# Patient Record
Sex: FEMALE | Race: WHITE | NOT HISPANIC OR LATINO | Employment: FULL TIME | URBAN - METROPOLITAN AREA
[De-identification: names, ages, dates, MRNs, and addresses within clinical notes are randomized per-mention and may not be internally consistent; named-entity substitution may affect disease eponyms.]

---

## 2023-09-11 ENCOUNTER — HOSPITAL ENCOUNTER (OUTPATIENT)
Dept: DATA CONVERSION | Facility: HOSPITAL | Age: 63
End: 2023-09-11
Attending: INTERNAL MEDICINE | Admitting: INTERNAL MEDICINE
Payer: COMMERCIAL

## 2023-09-11 DIAGNOSIS — K63.3 ULCER OF INTESTINE: ICD-10-CM

## 2023-09-11 DIAGNOSIS — M15.9 POLYOSTEOARTHRITIS, UNSPECIFIED: ICD-10-CM

## 2023-09-11 DIAGNOSIS — K63.5 POLYP OF COLON: ICD-10-CM

## 2023-09-11 DIAGNOSIS — C18.9 MALIGNANT NEOPLASM OF COLON, UNSPECIFIED (MULTI): ICD-10-CM

## 2023-09-11 DIAGNOSIS — R93.3 ABNORMAL FINDINGS ON DIAGNOSTIC IMAGING OF OTHER PARTS OF DIGESTIVE TRACT: ICD-10-CM

## 2023-09-11 DIAGNOSIS — E78.5 HYPERLIPIDEMIA, UNSPECIFIED: ICD-10-CM

## 2023-09-11 DIAGNOSIS — Z86.16 PERSONAL HISTORY OF COVID-19: ICD-10-CM

## 2023-09-11 DIAGNOSIS — C18.0 MALIGNANT NEOPLASM OF CECUM (MULTI): ICD-10-CM

## 2023-09-11 DIAGNOSIS — I10 ESSENTIAL (PRIMARY) HYPERTENSION: ICD-10-CM

## 2023-09-11 DIAGNOSIS — K64.4 RESIDUAL HEMORRHOIDAL SKIN TAGS: ICD-10-CM

## 2023-09-15 LAB
COMPLETE PATHOLOGY REPORT: NORMAL
CONVERTED ADDENDUM COMMENT 2: NORMAL
CONVERTED FINAL DIAGNOSIS: NORMAL
CONVERTED FINAL REPORT PDF LINK TO COPY AND PASTE: NORMAL
CONVERTED GROSS DESCRIPTION: NORMAL
CONVERTED PHYSICIAN NOTIFICATION: NORMAL

## 2023-09-27 PROBLEM — C18.9 ADENOCARCINOMA OF COLON (MULTI): Status: ACTIVE | Noted: 2023-09-27

## 2023-09-27 RX ORDER — ATORVASTATIN CALCIUM 20 MG/1
20 TABLET, FILM COATED ORAL DAILY
COMMUNITY
Start: 2023-07-31

## 2023-09-27 RX ORDER — METHYLPREDNISOLONE 4 MG/1
TABLET ORAL
COMMUNITY
Start: 2022-11-14 | End: 2023-11-16 | Stop reason: HOSPADM

## 2023-09-29 VITALS — BODY MASS INDEX: 21.8 KG/M2 | HEIGHT: 67 IN | WEIGHT: 138.89 LBS

## 2023-10-02 NOTE — PROGRESS NOTES
Subjective   Patient ID: Martine Cornejo is a 63 y.o. female who presents to clinic for imaging review and plan for next steps in treatment of new colon cancer.       HPI  Colonoscopy (to cecum) on 9/11/23 Impression:   - Hemorrhoids found on perianal exam.   - Perianal skin tags found on perianal exam.   - Likely malignant completely obstructing tumor in the region of the proximal ascending colon and cecum. Biopsied.   - One 5 mm polyp in the sigmoid colon, removed with a cold snare. Resected and retrieved.   - Non-bleeding external hemorrhoids  FINAL DIAGNOSIS A. CECUM MASS: -- INVASIVE MODERATELY DIFFERENTIATED ADENOCARCINOMA (COMMENT)   B. SIGMOID COLON, POLYP, COLD SNARE: -- HYPERPLASTIC POLYP  MMR - intact      9/27/23 - PET/ CT IMPRESSION:   1. Intensely FDG avid cecal wall thickening possibly extending to the parietal peritoneum compatible with recently diagnosed invasive cecal adenocarcinoma.   2. Additional nonspecific hypermetabolic focus along the cecal wall superolateral to the aforementioned mass. Finding may be physiologic with an additional site of neoplasm not excluded.   3. No PET-CT evidence for FDG avid locoregional or distant metastatic disease    Review of Systems   Constitutional:  Negative for activity change, appetite change, fatigue, fever and unexpected weight change.   HENT:  Negative for sore throat.    Eyes:  Negative for visual disturbance.   Respiratory:  Negative for shortness of breath.    Cardiovascular:  Negative for chest pain, palpitations and leg swelling.   Gastrointestinal:  Negative for abdominal distention, abdominal pain, anal bleeding, blood in stool, constipation, diarrhea, nausea, rectal pain and vomiting.   Endocrine: Negative for polydipsia.   Genitourinary:  Negative for difficulty urinating and dysuria.   Musculoskeletal:  Negative for arthralgias.   Skin:  Negative for wound.   Neurological:  Negative for dizziness, weakness and light-headedness.   Hematological:   Negative for adenopathy.   Psychiatric/Behavioral:  Negative for confusion.          Objective   Physical Exam  Vitals reviewed. Exam conducted with a chaperone present.   Constitutional:       Appearance: Normal appearance.   Cardiovascular:      Rate and Rhythm: Normal rate and regular rhythm.   Pulmonary:      Effort: Pulmonary effort is normal.      Breath sounds: Normal breath sounds.   Abdominal:      General: Abdomen is flat. There is no distension.      Palpations: Abdomen is soft. There is no mass.      Tenderness: There is no abdominal tenderness.      Hernia: No hernia is present.   Neurological:      General: No focal deficit present.      Mental Status: She is alert and oriented to person, place, and time.   Psychiatric:         Mood and Affect: Mood normal.         Behavior: Behavior normal.         Assessment/Plan   Suspicion of cecal cancer w/ fistula to right groin confirmed by colonoscopy. Adenocarcinoma is MMR intact. PET scan shows no obvious metastatic disease. Discussed diagnosis with patient. Advised right colectomy w/ possible en bloc abdominal wall resection. Procedure explained and all questions answered. Patient agrees to proceed.

## 2023-10-03 ENCOUNTER — OFFICE VISIT (OUTPATIENT)
Dept: SURGERY | Facility: CLINIC | Age: 63
End: 2023-10-03
Payer: COMMERCIAL

## 2023-10-03 VITALS
SYSTOLIC BLOOD PRESSURE: 129 MMHG | WEIGHT: 143 LBS | HEART RATE: 67 BPM | BODY MASS INDEX: 22.44 KG/M2 | HEIGHT: 67 IN | DIASTOLIC BLOOD PRESSURE: 77 MMHG

## 2023-10-03 DIAGNOSIS — C18.2 MALIGNANT NEOPLASM OF RIGHT COLON (MULTI): Primary | ICD-10-CM

## 2023-10-03 DIAGNOSIS — C18.2 COLON CANCER, ASCENDING (MULTI): ICD-10-CM

## 2023-10-03 DIAGNOSIS — C18.2 MALIGNANT NEOPLASM OF ASCENDING COLON (MULTI): ICD-10-CM

## 2023-10-03 PROCEDURE — 99213 OFFICE O/P EST LOW 20 MIN: CPT | Performed by: COLON & RECTAL SURGERY

## 2023-10-03 RX ORDER — METRONIDAZOLE 500 MG/100ML
500 INJECTION, SOLUTION INTRAVENOUS ONCE
Status: CANCELLED | OUTPATIENT
Start: 2023-10-03 | End: 2023-10-03

## 2023-10-03 ASSESSMENT — ENCOUNTER SYMPTOMS
CONSTIPATION: 0
SHORTNESS OF BREATH: 0
DIARRHEA: 0
POLYDIPSIA: 0
ARTHRALGIAS: 0
DIFFICULTY URINATING: 0
ABDOMINAL DISTENTION: 0
ADENOPATHY: 0
DYSURIA: 0
ABDOMINAL PAIN: 0
WEAKNESS: 0
ANAL BLEEDING: 0
ACTIVITY CHANGE: 0
BLOOD IN STOOL: 0
UNEXPECTED WEIGHT CHANGE: 0
APPETITE CHANGE: 0
DIZZINESS: 0
SORE THROAT: 0
LIGHT-HEADEDNESS: 0
PALPITATIONS: 0
NAUSEA: 0
VOMITING: 0
FATIGUE: 0
RECTAL PAIN: 0
CONFUSION: 0
WOUND: 0
FEVER: 0

## 2023-10-04 RX ORDER — GABAPENTIN 100 MG/1
CAPSULE ORAL
Qty: 3 CAPSULE | Refills: 0 | Status: SHIPPED | OUTPATIENT
Start: 2023-10-04 | End: 2023-11-16 | Stop reason: HOSPADM

## 2023-10-04 RX ORDER — POLYETHYLENE GLYCOL 3350 17 G/17G
235 POWDER, FOR SOLUTION ORAL ONCE
Qty: 235 G | Refills: 0 | Status: SHIPPED | OUTPATIENT
Start: 2023-10-04 | End: 2023-10-04

## 2023-10-04 RX ORDER — BISACODYL 5 MG
TABLET, DELAYED RELEASE (ENTERIC COATED) ORAL
Qty: 4 TABLET | Refills: 0 | Status: SHIPPED | OUTPATIENT
Start: 2023-10-04 | End: 2023-11-16 | Stop reason: HOSPADM

## 2023-10-04 RX ORDER — METRONIDAZOLE 500 MG/1
TABLET ORAL
Qty: 3 TABLET | Refills: 0 | Status: SHIPPED | OUTPATIENT
Start: 2023-10-04 | End: 2023-11-16 | Stop reason: HOSPADM

## 2023-10-04 RX ORDER — ACETAMINOPHEN 500 MG/1
CAPSULE, LIQUID FILLED ORAL
Qty: 10 CAPSULE | Refills: 0 | Status: SHIPPED | OUTPATIENT
Start: 2023-10-04

## 2023-10-30 ENCOUNTER — TELEMEDICINE CLINICAL SUPPORT (OUTPATIENT)
Dept: PREADMISSION TESTING | Facility: HOSPITAL | Age: 63
End: 2023-10-30
Payer: COMMERCIAL

## 2023-11-02 ENCOUNTER — PRE-ADMISSION TESTING (OUTPATIENT)
Dept: PREADMISSION TESTING | Facility: HOSPITAL | Age: 63
End: 2023-11-02
Payer: COMMERCIAL

## 2023-11-02 VITALS
OXYGEN SATURATION: 98 % | HEIGHT: 67 IN | BODY MASS INDEX: 22.66 KG/M2 | SYSTOLIC BLOOD PRESSURE: 134 MMHG | DIASTOLIC BLOOD PRESSURE: 85 MMHG | HEART RATE: 68 BPM | TEMPERATURE: 97.7 F | WEIGHT: 144.4 LBS

## 2023-11-02 DIAGNOSIS — C18.2 MALIGNANT NEOPLASM OF ASCENDING COLON (MULTI): ICD-10-CM

## 2023-11-02 DIAGNOSIS — Z01.818 PREOPERATIVE TESTING: Primary | ICD-10-CM

## 2023-11-02 DIAGNOSIS — C18.2 MALIGNANT NEOPLASM OF RIGHT COLON (MULTI): ICD-10-CM

## 2023-11-02 LAB
ABO GROUP (TYPE) IN BLOOD: NORMAL
ANION GAP SERPL CALC-SCNC: 12 MMOL/L (ref 10–20)
ANTIBODY SCREEN: NORMAL
BUN SERPL-MCNC: 16 MG/DL (ref 6–23)
CALCIUM SERPL-MCNC: 10.4 MG/DL (ref 8.6–10.6)
CEA SERPL-MCNC: 1.8 UG/L
CHLORIDE SERPL-SCNC: 104 MMOL/L (ref 98–107)
CO2 SERPL-SCNC: 29 MMOL/L (ref 21–32)
CREAT SERPL-MCNC: 0.81 MG/DL (ref 0.5–1.05)
ERYTHROCYTE [DISTWIDTH] IN BLOOD BY AUTOMATED COUNT: 13.2 % (ref 11.5–14.5)
GFR SERPL CREATININE-BSD FRML MDRD: 81 ML/MIN/1.73M*2
GLUCOSE SERPL-MCNC: 82 MG/DL (ref 74–99)
HCT VFR BLD AUTO: 40.1 % (ref 36–46)
HGB BLD-MCNC: 12.7 G/DL (ref 12–16)
MCH RBC QN AUTO: 29.9 PG (ref 26–34)
MCHC RBC AUTO-ENTMCNC: 31.7 G/DL (ref 32–36)
MCV RBC AUTO: 94 FL (ref 80–100)
NRBC BLD-RTO: 0 /100 WBCS (ref 0–0)
PLATELET # BLD AUTO: 331 X10*3/UL (ref 150–450)
POTASSIUM SERPL-SCNC: 4.2 MMOL/L (ref 3.5–5.3)
RBC # BLD AUTO: 4.25 X10*6/UL (ref 4–5.2)
RH FACTOR (ANTIGEN D): NORMAL
SODIUM SERPL-SCNC: 141 MMOL/L (ref 136–145)
WBC # BLD AUTO: 6.8 X10*3/UL (ref 4.4–11.3)

## 2023-11-02 PROCEDURE — 82378 CARCINOEMBRYONIC ANTIGEN: CPT

## 2023-11-02 PROCEDURE — 99204 OFFICE O/P NEW MOD 45 MIN: CPT | Performed by: NURSE PRACTITIONER

## 2023-11-02 PROCEDURE — 93010 ELECTROCARDIOGRAM REPORT: CPT | Performed by: INTERNAL MEDICINE

## 2023-11-02 PROCEDURE — 86850 RBC ANTIBODY SCREEN: CPT

## 2023-11-02 PROCEDURE — 36415 COLL VENOUS BLD VENIPUNCTURE: CPT

## 2023-11-02 PROCEDURE — 87081 CULTURE SCREEN ONLY: CPT

## 2023-11-02 PROCEDURE — 80048 BASIC METABOLIC PNL TOTAL CA: CPT

## 2023-11-02 PROCEDURE — 85027 COMPLETE CBC AUTOMATED: CPT

## 2023-11-02 RX ORDER — CHLORHEXIDINE GLUCONATE ORAL RINSE 1.2 MG/ML
15 SOLUTION DENTAL AS NEEDED
Qty: 120 ML | Refills: 0 | Status: SHIPPED | OUTPATIENT
Start: 2023-11-02 | End: 2023-11-16 | Stop reason: HOSPADM

## 2023-11-02 RX ORDER — CHLORHEXIDINE GLUCONATE 40 MG/ML
SOLUTION TOPICAL DAILY PRN
Qty: 118 ML | Refills: 0 | Status: SHIPPED | OUTPATIENT
Start: 2023-11-08 | End: 2023-11-16 | Stop reason: HOSPADM

## 2023-11-02 ASSESSMENT — DUKE ACTIVITY SCORE INDEX (DASI)
CAN YOU DO HEAVY WORK AROUND THE HOUSE LIKE SCRUBBING FLOORS OR LIFTING AND MOVING HEAVY FURNITURE: YES
CAN YOU RUN A SHORT DISTANCE: YES
CAN YOU TAKE CARE OF YOURSELF (EAT, DRESS, BATHE, OR USE TOILET): YES
CAN YOU CLIMB A FLIGHT OF STAIRS OR WALK UP A HILL: YES
CAN YOU WALK INDOORS, SUCH AS AROUND YOUR HOUSE: YES
CAN YOU HAVE SEXUAL RELATIONS: YES
TOTAL_SCORE: 58.2
DASI METS SCORE: 9.9
CAN YOU PARTICIPATE IN STRENOUS SPORTS LIKE SWIMMING, SINGLES TENNIS, FOOTBALL, BASKETBALL, OR SKIING: YES
CAN YOU PARTICIPATE IN MODERATE RECREATIONAL ACTIVITIES LIKE GOLF, BOWLING, DANCING, DOUBLES TENNIS OR THROWING A BASEBALL OR FOOTBALL: YES
CAN YOU DO YARD WORK LIKE RAKING LEAVES, WEEDING OR PUSHING A MOWER: YES
CAN YOU DO LIGHT WORK AROUND THE HOUSE LIKE DUSTING OR WASHING DISHES: YES
CAN YOU WALK A BLOCK OR TWO ON LEVEL GROUND: YES
CAN YOU DO MODERATE WORK AROUND THE HOUSE LIKE VACUUMING, SWEEPING FLOORS OR CARRYING GROCERIES: YES

## 2023-11-02 ASSESSMENT — CHADS2 SCORE
CHF: NO
CHADS2 SCORE: 0
PRIOR STROKE OR TIA OR THROMBOEMBOLISM: NO
DIABETES: NO
AGE GREATER THAN OR EQUAL TO 75: NO
HYPERTENSION: NO

## 2023-11-02 ASSESSMENT — ENCOUNTER SYMPTOMS
NECK NEGATIVE: 1
ENDOCRINE NEGATIVE: 1
NEUROLOGICAL NEGATIVE: 1
CONSTITUTIONAL NEGATIVE: 1
EYES NEGATIVE: 1
GASTROINTESTINAL NEGATIVE: 1
RESPIRATORY NEGATIVE: 1
CARDIOVASCULAR NEGATIVE: 1
MUSCULOSKELETAL NEGATIVE: 1

## 2023-11-02 ASSESSMENT — LIFESTYLE VARIABLES: SMOKING_STATUS: NONSMOKER

## 2023-11-02 NOTE — CPM/PAT H&P
CPM/PAT Evaluation       Name: Martine Cornejo (Martine Cornejo)  /Age: 1960/63 y.o.     Visit Type:   In-Person       Chief Complaint:      HPI    The patient is a 63 year old female with likely cecal or appendiceal neoplasm s/p spontaneous perforation presenting as right groin abscess. She is currently asymptomatic. She denies fever, chills, n/v, abdominal pain, chest pain, sob, weight loss or changes in bowel or bladder pattern. She presents today for perioperative evaluation in anticipation of right colectomy on 23 with Dr. Osuna.     Past Medical History:   Diagnosis Date    Adenocarcinoma of colon (CMS/HCC)     scheduled for surgery with Dr. Osuna on 2023    Chicken pox     Groin mass     Hyperlipidemia     Mononucleosis     Vision loss        Past Surgical History:   Procedure Laterality Date    COLONOSCOPY         Patient Sexual activity questions deferred to the physician.    Family History   Problem Relation Name Age of Onset    Coronary artery disease Father         Allergies   Allergen Reactions    Penicillins Unknown       Prior to Admission medications    Medication Sig Start Date End Date Taking? Authorizing Provider   acetaminophen (Tylenol) 500 mg capsule Take 500 mg by mouth every 6 hours starting two days prior to surgery. Take 1000 mg by mouth the morning of surgery, at least 1 hour prior to arrival time. 10/4/23   Sven Osuna MD   atorvastatin (Lipitor) 20 mg tablet Take 1 tablet (20 mg) by mouth once daily. 23   Historical Provider, MD   bisacodyl (Dulcolax, bisacodyl,) 5 mg EC tablet Do not crush, chew, or split. Take 10 mg by mouth at 3pm the day prior to your surgery. Take another 10 mg at 9pm the day prior to your surgery. 10/4/23   Sven Osuna MD   chlorhexidine (Hibiclens) 4 % external liquid Apply topically once daily as needed (preoperative) for up to 5 days. Do not start before 2023. 23  PRABHJOT Gallegos-CNP   chlorhexidine (Peridex)  0.12 % solution Use 15 mL in the mouth or throat if needed (preoperative) for up to 5 days. Swish and spit. Do not swallow 11/2/23 11/7/23  PRABHJOT Gallegos-CNP   gabapentin (Neurontin) 100 mg capsule Take 100 mg by mouth at bedtime starting on 11/10/23. 10/4/23   Sven Osuna MD   methylPREDNISolone (Medrol Dospak) 4 mg tablets Take by mouth. TAKE ONE ROW OF TABLETS EACH DAY AS INSTRUCTED ON THE PACKAGE 11/14/22   Historical Provider, MD   metroNIDAZOLE (Flagyl) 500 mg tablet Take 1 tablet of 500 mg, by mouth, at 6pm, 7pm and 11pm the day prior to surgery. 10/4/23   Sven Osuna MD   SIMVASTATIN ORAL   11/2/23  Historical Provider, MD HUANG ROS:   Constitutional:   neg    Neuro/Psych:   neg    Eyes:   neg     use of corrective lenses  Ears:   neg    Nose:   neg    Mouth:   neg    Throat:   neg    Neck:   neg    Cardio:   neg    Respiratory:   neg    Endocrine:   neg    GI:   neg    :   neg    Musculoskeletal:   neg    Hematologic:   neg    Skin:  neg        Physical Exam  Vitals reviewed.   Constitutional:       Appearance: Normal appearance.   HENT:      Head: Normocephalic and atraumatic.      Nose: Nose normal.      Mouth/Throat:      Mouth: Mucous membranes are moist.      Pharynx: Oropharynx is clear.   Eyes:      Extraocular Movements: Extraocular movements intact.      Conjunctiva/sclera: Conjunctivae normal.      Pupils: Pupils are equal, round, and reactive to light.   Cardiovascular:      Rate and Rhythm: Normal rate and regular rhythm.      Pulses: Normal pulses.      Heart sounds: Normal heart sounds.   Pulmonary:      Effort: Pulmonary effort is normal.      Breath sounds: Normal breath sounds.   Musculoskeletal:         General: Normal range of motion.      Cervical back: Normal range of motion.   Skin:     General: Skin is warm and dry.   Neurological:      General: No focal deficit present.      Mental Status: She is alert and oriented to person, place, and time.   Psychiatric:          Mood and Affect: Mood normal.         Behavior: Behavior normal.          PAT AIRWAY:   Airway:     Mallampati::  I    TM distance::  >3 FB    Neck ROM::  Full  normal        Visit Vitals  /85   Pulse 68   Temp 36.5 °C (97.7 °F) (Temporal)       DASI Risk Score      Flowsheet Row Most Recent Value   DASI SCORE 58.2   METS Score (Will be calculated only when all the questions are answered) 9.9          Caprini DVT Assessment      Flowsheet Row Most Recent Value   DVT Score 10   Current Status Major surgery planned, lasting over 3 hours, Present cancer or chemotherapy   Age 60-75 years   BMI 30 or less          Modified Frailty Index      Flowsheet Row Most Recent Value   Modified Frailty Index Calculator 0          CHADS2 Stroke Risk  Current as of 25 minutes ago        N/A 3 - 100%: High Risk   2 - 3%: Medium Risk   0 - 2%: Low Risk     Last Change: N/A          This score determines the patient's risk of having a stroke if the patient has atrial fibrillation.        This score is not applicable to this patient. Components are not calculated.          Revised Cardiac Risk Index      Flowsheet Row Most Recent Value   Revised Cardiac Risk Calculator 1          Apfel Simplified Score      Flowsheet Row Most Recent Value   Apfel Simplified Score Calculator 3          Risk Analysis Index Results This Encounter    No data found in the last 1 encounters.       Stop Bang Score      Flowsheet Row Most Recent Value   Do you snore loudly? 0   Do you often feel tired or fatigued after your sleep? 0   Has anyone ever observed you stop breathing in your sleep? 0   Do you have or are you being treated for high blood pressure? 0   Recent BMI (Calculated) 22.4   Is BMI greater than 35 kg/m2? 0=No   Age older than 50 years old? 1=Yes   Is your neck circumference greater than 17 inches (Male) or 16 inches (Female)? 0   Gender - Male 0=No   STOP-BANG Total Score 1          Results for orders placed or performed in visit on  11/02/23 (from the past 96 hour(s))   CEA   Result Value Ref Range    Carcinoembryonic AG 1.8 ug/L   Type And Screen   Result Value Ref Range    ABO TYPE O     Rh TYPE POS     ANTIBODY SCREEN NEG    CBC   Result Value Ref Range    WBC 6.8 4.4 - 11.3 x10*3/uL    nRBC 0.0 0.0 - 0.0 /100 WBCs    RBC 4.25 4.00 - 5.20 x10*6/uL    Hemoglobin 12.7 12.0 - 16.0 g/dL    Hematocrit 40.1 36.0 - 46.0 %    MCV 94 80 - 100 fL    MCH 29.9 26.0 - 34.0 pg    MCHC 31.7 (L) 32.0 - 36.0 g/dL    RDW 13.2 11.5 - 14.5 %    Platelets 331 150 - 450 x10*3/uL   Basic Metabolic Panel   Result Value Ref Range    Glucose 82 74 - 99 mg/dL    Sodium 141 136 - 145 mmol/L    Potassium 4.2 3.5 - 5.3 mmol/L    Chloride 104 98 - 107 mmol/L    Bicarbonate 29 21 - 32 mmol/L    Anion Gap 12 10 - 20 mmol/L    Urea Nitrogen 16 6 - 23 mg/dL    Creatinine 0.81 0.50 - 1.05 mg/dL    eGFR 81 >60 mL/min/1.73m*2    Calcium 10.4 8.6 - 10.6 mg/dL   Staphylococcus aureus/MRSA colonization, Culture    Specimen: Anterior Nares; Swab   Result Value Ref Range    Staph/MRSA Screen Culture No Staphylococcus aureus isolated         Diagnostic Results      EKG 11/2/23  See media tab    Assessment and Plan:     Neuro:   The patient has no neurological diagnoses or significant findings on chart review, clinical presentation, and evaluation.  No grossly apparent perioperative risk. The patient is at increased risk for perioperative stroke secondary to hyperlipidemia, female gender, general anesthesia, operative time >2.5 hours.    HEENT/Airway  No diagnoses, significant findings on chart review, clinical presentation, or evaluation.    Cardiovascular  The patient is scheduled for non-cardiac surgery associated with elevated risk.  The patient has no major cardiac contraindications to non- cardiac surgery.  RCRI  The patient meets 0-1 RCRI criteria and therefore has a less than 1% risk of major adverse cardiac complications.  EKG  The patient has no EKG or echocardiographic  changes concerning for myocardial ischemia.  No further cardiac evaluation is indicated  Heart Failure  The patient has no known history of heart failure.  Additionally, the patient reports no symptoms of heart failure and demonstrates no signs of heart failure.  Hypertension Evaluation  The patient has no known history of hypertension and has a normal blood pressure today.  Heart Rhythm Evaluation  The patient has no history of arrhythmias.  Heart Valve Evaluation  The patient has no known history of valvular heart disease. The patient has no symptoms or physical exam findings to suggest valvular heart disease.  CARDS EVAL  The patient is not followed by cardiology.  -The patient has a 30-day risk for MACE of 0 predictors, 3.9% risk for cardiac death, nonfatal myocardial infarction, and nonfatal cardiac arrest  Pulmonary   No significant findings on chart review or clinical presentation and evaluation. The patient is at increased risk of perioperative pulmonary complications secondary to upper abdominal surgery, advanced age greater than 60.  The patient has a stop bang score of 2, which places patient at low risk for having ANNA.  ARISCAT 26, Intermediate, 13.3% risk of in-hospital postoperative pulmonary complications  PRODIGY 11, intermediate of respiratory depression episode.    Hematology  No diagnoses or significant findings on chart review or clinical presentation and evaluation.  Antiplatelet management   The patient is not currently receiving antiplatelet therapy.  Anticoagulation management  The patient is not currently receiving anticoagulation therapy.  Caprini score 10, high risk of VTE  Transfusion Evaluation  A type and screen was obtained given the likelihood for perioperative transfusion of blood or blood products.    GI  The patient has diagnoses or significant findings on chart review or clinical presentation and evaluation significant for likely cecal or appendiceal neoplasm. Currently  asymptomatic. Scheduled for right colectomy with Dr. Osuna on 11/13/23.  Eat 10- 0    Genitourinary  No diagnoses or significant findings on chart review or clinical presentation and evaluation.    Renal  The patient has no known history of chronic kidney disease. No renal diagnoses or significant findings on chart review or clinical presentation and evaluation. The patient is scheduled for peritoneal surgery which places patient at higher risk of perioperative renal complications.    Musculoskeletal  No diagnoses or significant findings on chart review or clinical presentation and evaluation.    Endocrine  Diabetes Evaluation  The patient has no history of diabetes mellitus  Thyroid Disease Evaluation  The patient has no history of thyroid disease.    ID  No diagnoses or significant findings on chart review or clinical presentation and evaluation.    -Preoperative medication instructions were provided and reviewed with the patient.  Any additional testing or evaluation was explained to the patient.  NPO Instructions were discussed, and the patient's questions were answered prior to conclusion of this encounter

## 2023-11-02 NOTE — PREPROCEDURE INSTRUCTIONS
NPO Instructions:    Do not eat any food after midnight the night before your surgery/procedure.  You may have up to TEN ounces of clear liquids until TWO hours before surgery/procedure. This includes water, black tea/coffee, (no milk or cream), apple juice, and/or electrolyte drinks (Gatorade).  Yo may chew gum up to TWO hours before your surgery/procedure.    Additional Instructions:     We have sent a prescription for Hibiclens soap and Peridex mouth wash to your preferred pharmacy.  If you have not already, Please  your prescription and start using five days before surgery.  Follow the instruction sheet provided to you at your CPM/PAT appointment.    Avoid herbal supplements, multivitamins and NSAIDS (non-steroidal anti-inflammatory drugs) such as Advil, Aleve, Ibuprofen, Naproxen, Excedrin, Meloxicam or Celebrex for at least 7 days prior to surgery. May take Tylenol as needed.    Seven/Six Days before Surgery:  Review your medication instructions, stop indicated medications    Day of Surgery:  Review your medication instructions, take indicated medications  Wear comfortable loose fitting clothing  Do not use moisturizers, creams, lotions or perfume  All jewelry and valuables should be left at home    Arianna Rosado [facility]  [facility]  [phone] NPO Instructions:    Do not eat any food after midnight the night before your surgery/procedure.  You may have up to TEN ounces of clear liquids until TWO hours before surgery/procedure. This includes water, black tea/coffee, (no milk or cream), apple juice, and/or electrolyte drinks (Gatorade).  Yo may chew gum up to TWO hours before your surgery/procedure.    Additional Instructions:     We have sent a prescription for Hibiclens soap and Peridex mouth wash to your preferred pharmacy.  If you have not already, Please  your prescription and start using five days before surgery.  Follow the instruction sheet provided to you at your  CPM/PAT appointment.    Avoid herbal supplements, multivitamins and NSAIDS (non-steroidal anti-inflammatory drugs) such as Advil, Aleve, Ibuprofen, Naproxen, Excedrin, Meloxicam or Celebrex for at least 7 days prior to surgery. May take Tylenol as needed.    Seven/Six Days before Surgery:  Review your medication instructions, stop indicated medications    Day of Surgery:  Review your medication instructions, take indicated medications  Wear comfortable loose fitting clothing  Do not use moisturizers, creams, lotions or perfume  All jewelry and valuables should be left at home    Arianna Rosado [facility]  [facility]  [phone]

## 2023-11-03 ENCOUNTER — HOSPITAL ENCOUNTER (OUTPATIENT)
Dept: CARDIOLOGY | Facility: HOSPITAL | Age: 63
Discharge: HOME | End: 2023-11-03
Payer: COMMERCIAL

## 2023-11-03 DIAGNOSIS — C18.2 MALIGNANT NEOPLASM OF RIGHT COLON (MULTI): ICD-10-CM

## 2023-11-03 PROCEDURE — 93005 ELECTROCARDIOGRAM TRACING: CPT

## 2023-11-04 LAB — STAPHYLOCOCCUS SPEC CULT: NORMAL

## 2023-11-07 LAB
ATRIAL RATE: 63 BPM
P AXIS: 73 DEGREES
P OFFSET: 186 MS
P ONSET: 141 MS
PR INTERVAL: 170 MS
Q ONSET: 226 MS
QRS COUNT: 11 BEATS
QRS DURATION: 86 MS
QT INTERVAL: 378 MS
QTC CALCULATION(BAZETT): 386 MS
QTC FREDERICIA: 384 MS
R AXIS: -27 DEGREES
T AXIS: 21 DEGREES
T OFFSET: 415 MS
VENTRICULAR RATE: 63 BPM

## 2023-11-13 ENCOUNTER — ANESTHESIA (OUTPATIENT)
Dept: OPERATING ROOM | Facility: HOSPITAL | Age: 63
DRG: 331 | End: 2023-11-13
Payer: COMMERCIAL

## 2023-11-13 ENCOUNTER — HOSPITAL ENCOUNTER (INPATIENT)
Facility: HOSPITAL | Age: 63
LOS: 3 days | Discharge: HOME | DRG: 331 | End: 2023-11-16
Attending: COLON & RECTAL SURGERY | Admitting: COLON & RECTAL SURGERY
Payer: COMMERCIAL

## 2023-11-13 ENCOUNTER — ANESTHESIA EVENT (OUTPATIENT)
Dept: OPERATING ROOM | Facility: HOSPITAL | Age: 63
DRG: 331 | End: 2023-11-13
Payer: COMMERCIAL

## 2023-11-13 DIAGNOSIS — C18.2 MALIGNANT NEOPLASM OF ASCENDING COLON (MULTI): ICD-10-CM

## 2023-11-13 DIAGNOSIS — C18.2 MALIGNANT NEOPLASM OF RIGHT COLON (MULTI): ICD-10-CM

## 2023-11-13 DIAGNOSIS — C18.2 COLON CANCER, ASCENDING (MULTI): ICD-10-CM

## 2023-11-13 DIAGNOSIS — C18.0 CECAL CANCER (MULTI): Primary | ICD-10-CM

## 2023-11-13 LAB
ABO GROUP (TYPE) IN BLOOD: NORMAL
ANTIBODY SCREEN: NORMAL
RH FACTOR (ANTIGEN D): NORMAL
SARS-COV-2 RNA RESP QL NAA+PROBE: NOT DETECTED

## 2023-11-13 PROCEDURE — 2500000004 HC RX 250 GENERAL PHARMACY W/ HCPCS (ALT 636 FOR OP/ED): Performed by: ANESTHESIOLOGY

## 2023-11-13 PROCEDURE — 1100000001 HC PRIVATE ROOM DAILY

## 2023-11-13 PROCEDURE — 88332 PATH CONSLTJ SURG EA ADD BLK: CPT | Performed by: PATHOLOGY

## 2023-11-13 PROCEDURE — 2500000004 HC RX 250 GENERAL PHARMACY W/ HCPCS (ALT 636 FOR OP/ED): Performed by: COLON & RECTAL SURGERY

## 2023-11-13 PROCEDURE — 2500000001 HC RX 250 WO HCPCS SELF ADMINISTERED DRUGS (ALT 637 FOR MEDICARE OP): Performed by: STUDENT IN AN ORGANIZED HEALTH CARE EDUCATION/TRAINING PROGRAM

## 2023-11-13 PROCEDURE — 88341 IMHCHEM/IMCYTCHM EA ADD ANTB: CPT | Performed by: PATHOLOGY

## 2023-11-13 PROCEDURE — A4217 STERILE WATER/SALINE, 500 ML: HCPCS | Performed by: COLON & RECTAL SURGERY

## 2023-11-13 PROCEDURE — 99223 1ST HOSP IP/OBS HIGH 75: CPT | Performed by: STUDENT IN AN ORGANIZED HEALTH CARE EDUCATION/TRAINING PROGRAM

## 2023-11-13 PROCEDURE — 7100000001 HC RECOVERY ROOM TIME - INITIAL BASE CHARGE: Performed by: COLON & RECTAL SURGERY

## 2023-11-13 PROCEDURE — 86901 BLOOD TYPING SEROLOGIC RH(D): CPT | Performed by: COLON & RECTAL SURGERY

## 2023-11-13 PROCEDURE — 3700000002 HC GENERAL ANESTHESIA TIME - EACH INCREMENTAL 1 MINUTE: Performed by: COLON & RECTAL SURGERY

## 2023-11-13 PROCEDURE — 36415 COLL VENOUS BLD VENIPUNCTURE: CPT | Performed by: COLON & RECTAL SURGERY

## 2023-11-13 PROCEDURE — 3600000003 HC OR TIME - INITIAL BASE CHARGE - PROCEDURE LEVEL THREE: Performed by: COLON & RECTAL SURGERY

## 2023-11-13 PROCEDURE — 2500000004 HC RX 250 GENERAL PHARMACY W/ HCPCS (ALT 636 FOR OP/ED): Performed by: ANESTHESIOLOGIST ASSISTANT

## 2023-11-13 PROCEDURE — 2500000004 HC RX 250 GENERAL PHARMACY W/ HCPCS (ALT 636 FOR OP/ED): Performed by: STUDENT IN AN ORGANIZED HEALTH CARE EDUCATION/TRAINING PROGRAM

## 2023-11-13 PROCEDURE — 76942 ECHO GUIDE FOR BIOPSY: CPT | Performed by: STUDENT IN AN ORGANIZED HEALTH CARE EDUCATION/TRAINING PROGRAM

## 2023-11-13 PROCEDURE — 88309 TISSUE EXAM BY PATHOLOGIST: CPT | Performed by: PATHOLOGY

## 2023-11-13 PROCEDURE — 2500000005 HC RX 250 GENERAL PHARMACY W/O HCPCS: Performed by: ANESTHESIOLOGIST ASSISTANT

## 2023-11-13 PROCEDURE — 3600000008 HC OR TIME - EACH INCREMENTAL 1 MINUTE - PROCEDURE LEVEL THREE: Performed by: COLON & RECTAL SURGERY

## 2023-11-13 PROCEDURE — 2720000007 HC OR 272 NO HCPCS: Performed by: COLON & RECTAL SURGERY

## 2023-11-13 PROCEDURE — 88341 IMHCHEM/IMCYTCHM EA ADD ANTB: CPT | Mod: TC,SUR | Performed by: COLON & RECTAL SURGERY

## 2023-11-13 PROCEDURE — 88307 TISSUE EXAM BY PATHOLOGIST: CPT | Performed by: PATHOLOGY

## 2023-11-13 PROCEDURE — 88331 PATH CONSLTJ SURG 1 BLK 1SPC: CPT | Performed by: PATHOLOGY

## 2023-11-13 PROCEDURE — 3700000001 HC GENERAL ANESTHESIA TIME - INITIAL BASE CHARGE: Performed by: COLON & RECTAL SURGERY

## 2023-11-13 PROCEDURE — 87635 SARS-COV-2 COVID-19 AMP PRB: CPT

## 2023-11-13 PROCEDURE — A44140 PERIPHERAL IV: Performed by: ANESTHESIOLOGY

## 2023-11-13 PROCEDURE — A44140 PR PART REMOVAL COLON W ANASTOMOSIS: Performed by: ANESTHESIOLOGIST ASSISTANT

## 2023-11-13 PROCEDURE — 96372 THER/PROPH/DIAG INJ SC/IM: CPT | Performed by: STUDENT IN AN ORGANIZED HEALTH CARE EDUCATION/TRAINING PROGRAM

## 2023-11-13 PROCEDURE — 44160 REMOVAL OF COLON: CPT | Performed by: COLON & RECTAL SURGERY

## 2023-11-13 PROCEDURE — 88342 IMHCHEM/IMCYTCHM 1ST ANTB: CPT | Performed by: PATHOLOGY

## 2023-11-13 PROCEDURE — 7100000002 HC RECOVERY ROOM TIME - EACH INCREMENTAL 1 MINUTE: Performed by: COLON & RECTAL SURGERY

## 2023-11-13 RX ORDER — WATER 1 ML/ML
IRRIGANT IRRIGATION AS NEEDED
Status: DISCONTINUED | OUTPATIENT
Start: 2023-11-13 | End: 2023-11-13 | Stop reason: HOSPADM

## 2023-11-13 RX ORDER — PROPOFOL 10 MG/ML
INJECTION, EMULSION INTRAVENOUS AS NEEDED
Status: DISCONTINUED | OUTPATIENT
Start: 2023-11-13 | End: 2023-11-13

## 2023-11-13 RX ORDER — LIDOCAINE HYDROCHLORIDE 10 MG/ML
0.1 INJECTION, SOLUTION EPIDURAL; INFILTRATION; INTRACAUDAL; PERINEURAL ONCE
Status: DISCONTINUED | OUTPATIENT
Start: 2023-11-13 | End: 2023-11-13 | Stop reason: HOSPADM

## 2023-11-13 RX ORDER — METRONIDAZOLE 500 MG/100ML
500 INJECTION, SOLUTION INTRAVENOUS ONCE
Status: COMPLETED | OUTPATIENT
Start: 2023-11-13 | End: 2023-11-13

## 2023-11-13 RX ORDER — OXYCODONE HYDROCHLORIDE 5 MG/1
10 TABLET ORAL EVERY 4 HOURS PRN
Status: DISCONTINUED | OUTPATIENT
Start: 2023-11-13 | End: 2023-11-16 | Stop reason: HOSPADM

## 2023-11-13 RX ORDER — DEXTROSE MONOHYDRATE AND SODIUM CHLORIDE 5; .45 G/100ML; G/100ML
40 INJECTION, SOLUTION INTRAVENOUS CONTINUOUS
Status: DISCONTINUED | OUTPATIENT
Start: 2023-11-13 | End: 2023-11-15

## 2023-11-13 RX ORDER — ONDANSETRON 4 MG/1
4 TABLET, ORALLY DISINTEGRATING ORAL EVERY 8 HOURS PRN
Status: DISCONTINUED | OUTPATIENT
Start: 2023-11-13 | End: 2023-11-16 | Stop reason: HOSPADM

## 2023-11-13 RX ORDER — MIDAZOLAM HYDROCHLORIDE 1 MG/ML
INJECTION INTRAMUSCULAR; INTRAVENOUS AS NEEDED
Status: DISCONTINUED | OUTPATIENT
Start: 2023-11-13 | End: 2023-11-13

## 2023-11-13 RX ORDER — ENOXAPARIN SODIUM 100 MG/ML
40 INJECTION SUBCUTANEOUS EVERY 24 HOURS
Status: DISCONTINUED | OUTPATIENT
Start: 2023-11-13 | End: 2023-11-16 | Stop reason: HOSPADM

## 2023-11-13 RX ORDER — ROCURONIUM BROMIDE 10 MG/ML
INJECTION, SOLUTION INTRAVENOUS AS NEEDED
Status: DISCONTINUED | OUTPATIENT
Start: 2023-11-13 | End: 2023-11-13

## 2023-11-13 RX ORDER — FENTANYL CITRATE 50 UG/ML
INJECTION, SOLUTION INTRAMUSCULAR; INTRAVENOUS AS NEEDED
Status: DISCONTINUED | OUTPATIENT
Start: 2023-11-13 | End: 2023-11-13

## 2023-11-13 RX ORDER — CIPROFLOXACIN 2 MG/ML
INJECTION, SOLUTION INTRAVENOUS CONTINUOUS PRN
Status: DISCONTINUED | OUTPATIENT
Start: 2023-11-13 | End: 2023-11-13

## 2023-11-13 RX ORDER — SODIUM CHLORIDE, SODIUM LACTATE, POTASSIUM CHLORIDE, CALCIUM CHLORIDE 600; 310; 30; 20 MG/100ML; MG/100ML; MG/100ML; MG/100ML
100 INJECTION, SOLUTION INTRAVENOUS CONTINUOUS
Status: DISCONTINUED | OUTPATIENT
Start: 2023-11-13 | End: 2023-11-13 | Stop reason: HOSPADM

## 2023-11-13 RX ORDER — PHENYLEPHRINE HYDROCHLORIDE 10 MG/ML
INJECTION INTRAVENOUS AS NEEDED
Status: DISCONTINUED | OUTPATIENT
Start: 2023-11-13 | End: 2023-11-13

## 2023-11-13 RX ORDER — OXYCODONE HYDROCHLORIDE 5 MG/1
5 TABLET ORAL EVERY 4 HOURS PRN
Status: DISCONTINUED | OUTPATIENT
Start: 2023-11-13 | End: 2023-11-13 | Stop reason: HOSPADM

## 2023-11-13 RX ORDER — HYDROMORPHONE HYDROCHLORIDE 1 MG/ML
INJECTION, SOLUTION INTRAMUSCULAR; INTRAVENOUS; SUBCUTANEOUS AS NEEDED
Status: DISCONTINUED | OUTPATIENT
Start: 2023-11-13 | End: 2023-11-13

## 2023-11-13 RX ORDER — NORETHINDRONE AND ETHINYL ESTRADIOL 0.5-0.035
KIT ORAL AS NEEDED
Status: DISCONTINUED | OUTPATIENT
Start: 2023-11-13 | End: 2023-11-13

## 2023-11-13 RX ORDER — OXYCODONE HYDROCHLORIDE 5 MG/1
5 TABLET ORAL EVERY 4 HOURS PRN
Status: DISCONTINUED | OUTPATIENT
Start: 2023-11-13 | End: 2023-11-16 | Stop reason: HOSPADM

## 2023-11-13 RX ORDER — NALOXONE HYDROCHLORIDE 0.4 MG/ML
0.2 INJECTION, SOLUTION INTRAMUSCULAR; INTRAVENOUS; SUBCUTANEOUS EVERY 5 MIN PRN
Status: DISCONTINUED | OUTPATIENT
Start: 2023-11-13 | End: 2023-11-14

## 2023-11-13 RX ORDER — LIDOCAINE HCL/PF 100 MG/5ML
SYRINGE (ML) INTRAVENOUS AS NEEDED
Status: DISCONTINUED | OUTPATIENT
Start: 2023-11-13 | End: 2023-11-13

## 2023-11-13 RX ORDER — ONDANSETRON HYDROCHLORIDE 2 MG/ML
INJECTION, SOLUTION INTRAVENOUS AS NEEDED
Status: DISCONTINUED | OUTPATIENT
Start: 2023-11-13 | End: 2023-11-13

## 2023-11-13 RX ORDER — ONDANSETRON HYDROCHLORIDE 2 MG/ML
4 INJECTION, SOLUTION INTRAVENOUS EVERY 8 HOURS PRN
Status: DISCONTINUED | OUTPATIENT
Start: 2023-11-13 | End: 2023-11-16 | Stop reason: HOSPADM

## 2023-11-13 RX ORDER — PROPOFOL 10 MG/ML
INJECTION, EMULSION INTRAVENOUS CONTINUOUS PRN
Status: DISCONTINUED | OUTPATIENT
Start: 2023-11-13 | End: 2023-11-13

## 2023-11-13 RX ORDER — SIMVASTATIN 20 MG/1
20 TABLET, FILM COATED ORAL NIGHTLY
Status: DISCONTINUED | OUTPATIENT
Start: 2023-11-14 | End: 2023-11-16 | Stop reason: HOSPADM

## 2023-11-13 RX ORDER — ONDANSETRON HYDROCHLORIDE 2 MG/ML
4 INJECTION, SOLUTION INTRAVENOUS ONCE AS NEEDED
Status: COMPLETED | OUTPATIENT
Start: 2023-11-13 | End: 2023-11-13

## 2023-11-13 RX ORDER — HEPARIN SODIUM 5000 [USP'U]/ML
5000 INJECTION, SOLUTION INTRAVENOUS; SUBCUTANEOUS ONCE
Status: COMPLETED | OUTPATIENT
Start: 2023-11-13 | End: 2023-11-13

## 2023-11-13 RX ORDER — DEXAMETHASONE SODIUM PHOSPHATE 4 MG/ML
INJECTION, SOLUTION INTRA-ARTICULAR; INTRALESIONAL; INTRAMUSCULAR; INTRAVENOUS; SOFT TISSUE AS NEEDED
Status: DISCONTINUED | OUTPATIENT
Start: 2023-11-13 | End: 2023-11-13

## 2023-11-13 RX ORDER — SODIUM CHLORIDE 0.9 G/100ML
IRRIGANT IRRIGATION AS NEEDED
Status: DISCONTINUED | OUTPATIENT
Start: 2023-11-13 | End: 2023-11-13 | Stop reason: HOSPADM

## 2023-11-13 RX ORDER — ACETAMINOPHEN 325 MG/1
650 TABLET ORAL EVERY 4 HOURS PRN
Status: DISCONTINUED | OUTPATIENT
Start: 2023-11-13 | End: 2023-11-14

## 2023-11-13 RX ORDER — ROPIVACAINE IN 0.9% SOD CHL/PF 0.2 %
6 PLASTIC BAG, INJECTION (ML) EPIDURAL CONTINUOUS
Status: DISCONTINUED | OUTPATIENT
Start: 2023-11-13 | End: 2023-11-16 | Stop reason: HOSPADM

## 2023-11-13 RX ADMIN — ROCURONIUM BROMIDE 60 MG: 50 INJECTION, SOLUTION INTRAVENOUS at 14:17

## 2023-11-13 RX ADMIN — MIDAZOLAM HYDROCHLORIDE 1 MG: 1 INJECTION, SOLUTION INTRAMUSCULAR; INTRAVENOUS at 13:41

## 2023-11-13 RX ADMIN — CIPROFLOXACIN: 400 INJECTION, SOLUTION INTRAVENOUS at 14:31

## 2023-11-13 RX ADMIN — LIDOCAINE HYDROCHLORIDE 40 MG: 20 INJECTION INTRAVENOUS at 14:19

## 2023-11-13 RX ADMIN — MIDAZOLAM HYDROCHLORIDE 1 MG: 1 INJECTION, SOLUTION INTRAMUSCULAR; INTRAVENOUS at 14:14

## 2023-11-13 RX ADMIN — DEXAMETHASONE SODIUM PHOSPHATE 4 MG: 4 INJECTION, SOLUTION INTRA-ARTICULAR; INTRALESIONAL; INTRAMUSCULAR; INTRAVENOUS; SOFT TISSUE at 14:37

## 2023-11-13 RX ADMIN — ROCURONIUM BROMIDE 20 MG: 50 INJECTION, SOLUTION INTRAVENOUS at 15:49

## 2023-11-13 RX ADMIN — HYDROMORPHONE HYDROCHLORIDE 0.5 MG: 2 INJECTION, SOLUTION INTRAMUSCULAR; INTRAVENOUS; SUBCUTANEOUS at 18:52

## 2023-11-13 RX ADMIN — ONDANSETRON 4 MG: 2 INJECTION INTRAMUSCULAR; INTRAVENOUS at 19:32

## 2023-11-13 RX ADMIN — EPHEDRINE SULFATE 10 MG: 50 INJECTION, SOLUTION INTRAVENOUS at 15:44

## 2023-11-13 RX ADMIN — PROPOFOL 150 MG: 10 INJECTION, EMULSION INTRAVENOUS at 14:16

## 2023-11-13 RX ADMIN — OXYCODONE HYDROCHLORIDE 10 MG: 5 TABLET ORAL at 21:31

## 2023-11-13 RX ADMIN — PROPOFOL 30 MCG/KG/MIN: 10 INJECTION, EMULSION INTRAVENOUS at 16:34

## 2023-11-13 RX ADMIN — PHENYLEPHRINE HYDROCHLORIDE 120 MCG: 10 INJECTION INTRAVENOUS at 14:57

## 2023-11-13 RX ADMIN — HYDROMORPHONE HYDROCHLORIDE 0.3 MG: 1 INJECTION, SOLUTION INTRAMUSCULAR; INTRAVENOUS; SUBCUTANEOUS at 16:49

## 2023-11-13 RX ADMIN — SUGAMMADEX 200 MG: 100 INJECTION, SOLUTION INTRAVENOUS at 17:20

## 2023-11-13 RX ADMIN — PHENYLEPHRINE HYDROCHLORIDE 80 MCG: 10 INJECTION INTRAVENOUS at 14:49

## 2023-11-13 RX ADMIN — HYDROMORPHONE HYDROCHLORIDE 0.2 MG: 1 INJECTION, SOLUTION INTRAMUSCULAR; INTRAVENOUS; SUBCUTANEOUS at 17:06

## 2023-11-13 RX ADMIN — PHENYLEPHRINE HYDROCHLORIDE 80 MCG: 10 INJECTION INTRAVENOUS at 14:53

## 2023-11-13 RX ADMIN — DEXTROSE AND SODIUM CHLORIDE 40 ML/HR: 5; 450 INJECTION, SOLUTION INTRAVENOUS at 21:15

## 2023-11-13 RX ADMIN — HYDROMORPHONE HYDROCHLORIDE 0.5 MG: 2 INJECTION, SOLUTION INTRAMUSCULAR; INTRAVENOUS; SUBCUTANEOUS at 18:07

## 2023-11-13 RX ADMIN — EPHEDRINE SULFATE 10 MG: 50 INJECTION, SOLUTION INTRAVENOUS at 15:00

## 2023-11-13 RX ADMIN — ENOXAPARIN SODIUM 40 MG: 100 INJECTION SUBCUTANEOUS at 21:15

## 2023-11-13 RX ADMIN — PHENYLEPHRINE HYDROCHLORIDE 40 MCG: 10 INJECTION INTRAVENOUS at 14:46

## 2023-11-13 RX ADMIN — SODIUM CHLORIDE, SODIUM LACTATE, POTASSIUM CHLORIDE, AND CALCIUM CHLORIDE: 600; 310; 30; 20 INJECTION, SOLUTION INTRAVENOUS at 13:42

## 2023-11-13 RX ADMIN — HEPARIN SODIUM 5000 UNITS: 5000 INJECTION, SOLUTION INTRAVENOUS; SUBCUTANEOUS at 13:00

## 2023-11-13 RX ADMIN — ONDANSETRON 4 MG: 2 INJECTION INTRAMUSCULAR; INTRAVENOUS at 17:10

## 2023-11-13 RX ADMIN — FENTANYL CITRATE 100 MCG: 50 INJECTION, SOLUTION INTRAMUSCULAR; INTRAVENOUS at 14:16

## 2023-11-13 RX ADMIN — METRONIDAZOLE 500 MG: 500 INJECTION, SOLUTION INTRAVENOUS at 13:57

## 2023-11-13 SDOH — SOCIAL STABILITY: SOCIAL INSECURITY: HAS ANYONE EVER THREATENED TO HURT YOUR FAMILY OR YOUR PETS?: NO

## 2023-11-13 SDOH — ECONOMIC STABILITY: FOOD INSECURITY: WITHIN THE PAST 12 MONTHS, THE FOOD YOU BOUGHT JUST DIDN'T LAST AND YOU DIDN'T HAVE MONEY TO GET MORE.: PATIENT DECLINED

## 2023-11-13 SDOH — SOCIAL STABILITY: SOCIAL NETWORK: HOW OFTEN DO YOU ATTEND CHURCH OR RELIGIOUS SERVICES?: PATIENT DECLINED

## 2023-11-13 SDOH — SOCIAL STABILITY: SOCIAL INSECURITY: ABUSE: ADULT

## 2023-11-13 SDOH — ECONOMIC STABILITY: INCOME INSECURITY: IN THE LAST 12 MONTHS, WAS THERE A TIME WHEN YOU WERE NOT ABLE TO PAY THE MORTGAGE OR RENT ON TIME?: NO

## 2023-11-13 SDOH — SOCIAL STABILITY: SOCIAL INSECURITY: DOES ANYONE TRY TO KEEP YOU FROM HAVING/CONTACTING OTHER FRIENDS OR DOING THINGS OUTSIDE YOUR HOME?: NO

## 2023-11-13 SDOH — SOCIAL STABILITY: SOCIAL INSECURITY: WITHIN THE LAST YEAR, HAVE YOU BEEN AFRAID OF YOUR PARTNER OR EX-PARTNER?: PATIENT DECLINED

## 2023-11-13 SDOH — HEALTH STABILITY: MENTAL HEALTH: CURRENT SMOKER: 0

## 2023-11-13 SDOH — ECONOMIC STABILITY: HOUSING INSECURITY
IN THE LAST 12 MONTHS, WAS THERE A TIME WHEN YOU DID NOT HAVE A STEADY PLACE TO SLEEP OR SLEPT IN A SHELTER (INCLUDING NOW)?: NO

## 2023-11-13 SDOH — ECONOMIC STABILITY: HOUSING INSECURITY: IN THE LAST 12 MONTHS, HOW MANY PLACES HAVE YOU LIVED?: 1

## 2023-11-13 SDOH — ECONOMIC STABILITY: TRANSPORTATION INSECURITY
IN THE PAST 12 MONTHS, HAS LACK OF TRANSPORTATION KEPT YOU FROM MEETINGS, WORK, OR FROM GETTING THINGS NEEDED FOR DAILY LIVING?: NO

## 2023-11-13 SDOH — ECONOMIC STABILITY: INCOME INSECURITY: HOW HARD IS IT FOR YOU TO PAY FOR THE VERY BASICS LIKE FOOD, HOUSING, MEDICAL CARE, AND HEATING?: NOT HARD AT ALL

## 2023-11-13 SDOH — ECONOMIC STABILITY: TRANSPORTATION INSECURITY
IN THE PAST 12 MONTHS, HAS THE LACK OF TRANSPORTATION KEPT YOU FROM MEDICAL APPOINTMENTS OR FROM GETTING MEDICATIONS?: NO

## 2023-11-13 SDOH — SOCIAL STABILITY: SOCIAL INSECURITY: HAVE YOU HAD THOUGHTS OF HARMING ANYONE ELSE?: NO

## 2023-11-13 SDOH — SOCIAL STABILITY: SOCIAL NETWORK
DO YOU BELONG TO ANY CLUBS OR ORGANIZATIONS SUCH AS CHURCH GROUPS UNIONS, FRATERNAL OR ATHLETIC GROUPS, OR SCHOOL GROUPS?: PATIENT DECLINED

## 2023-11-13 SDOH — SOCIAL STABILITY: SOCIAL INSECURITY
WITHIN THE LAST YEAR, HAVE YOU BEEN KICKED, HIT, SLAPPED, OR OTHERWISE PHYSICALLY HURT BY YOUR PARTNER OR EX-PARTNER?: PATIENT DECLINED

## 2023-11-13 SDOH — SOCIAL STABILITY: SOCIAL INSECURITY: ARE YOU OR HAVE YOU BEEN THREATENED OR ABUSED PHYSICALLY, EMOTIONALLY, OR SEXUALLY BY ANYONE?: NO

## 2023-11-13 SDOH — HEALTH STABILITY: MENTAL HEALTH
STRESS IS WHEN SOMEONE FEELS TENSE, NERVOUS, ANXIOUS, OR CAN'T SLEEP AT NIGHT BECAUSE THEIR MIND IS TROUBLED. HOW STRESSED ARE YOU?: PATIENT DECLINED

## 2023-11-13 SDOH — SOCIAL STABILITY: SOCIAL NETWORK: ARE YOU MARRIED, WIDOWED, DIVORCED, SEPARATED, NEVER MARRIED, OR LIVING WITH A PARTNER?: PATIENT DECLINED

## 2023-11-13 SDOH — SOCIAL STABILITY: SOCIAL INSECURITY: ARE THERE ANY APPARENT SIGNS OF INJURIES/BEHAVIORS THAT COULD BE RELATED TO ABUSE/NEGLECT?: NO

## 2023-11-13 SDOH — SOCIAL STABILITY: SOCIAL NETWORK: HOW OFTEN DO YOU GET TOGETHER WITH FRIENDS OR RELATIVES?: PATIENT DECLINED

## 2023-11-13 SDOH — SOCIAL STABILITY: SOCIAL NETWORK: IN A TYPICAL WEEK, HOW MANY TIMES DO YOU TALK ON THE PHONE WITH FAMILY, FRIENDS, OR NEIGHBORS?: PATIENT DECLINED

## 2023-11-13 SDOH — SOCIAL STABILITY: SOCIAL INSECURITY: WERE YOU ABLE TO COMPLETE ALL THE BEHAVIORAL HEALTH SCREENINGS?: YES

## 2023-11-13 SDOH — SOCIAL STABILITY: SOCIAL INSECURITY
WITHIN THE LAST YEAR, HAVE TO BEEN RAPED OR FORCED TO HAVE ANY KIND OF SEXUAL ACTIVITY BY YOUR PARTNER OR EX-PARTNER?: PATIENT DECLINED

## 2023-11-13 SDOH — SOCIAL STABILITY: SOCIAL INSECURITY: DO YOU FEEL UNSAFE GOING BACK TO THE PLACE WHERE YOU ARE LIVING?: NO

## 2023-11-13 SDOH — SOCIAL STABILITY: SOCIAL INSECURITY
WITHIN THE LAST YEAR, HAVE YOU BEEN HUMILIATED OR EMOTIONALLY ABUSED IN OTHER WAYS BY YOUR PARTNER OR EX-PARTNER?: PATIENT DECLINED

## 2023-11-13 SDOH — SOCIAL STABILITY: SOCIAL INSECURITY: DO YOU FEEL ANYONE HAS EXPLOITED OR TAKEN ADVANTAGE OF YOU FINANCIALLY OR OF YOUR PERSONAL PROPERTY?: NO

## 2023-11-13 SDOH — ECONOMIC STABILITY: FOOD INSECURITY: WITHIN THE PAST 12 MONTHS, YOU WORRIED THAT YOUR FOOD WOULD RUN OUT BEFORE YOU GOT MONEY TO BUY MORE.: PATIENT DECLINED

## 2023-11-13 SDOH — SOCIAL STABILITY: SOCIAL NETWORK: HOW OFTEN DO YOU ATTENT MEETINGS OF THE CLUB OR ORGANIZATION YOU BELONG TO?: PATIENT DECLINED

## 2023-11-13 ASSESSMENT — ACTIVITIES OF DAILY LIVING (ADL)
GROOMING: INDEPENDENT
JUDGMENT_ADEQUATE_SAFELY_COMPLETE_DAILY_ACTIVITIES: YES
LACK_OF_TRANSPORTATION: NO
DRESSING YOURSELF: INDEPENDENT
HEARING - LEFT EAR: FUNCTIONAL
PATIENT'S MEMORY ADEQUATE TO SAFELY COMPLETE DAILY ACTIVITIES?: YES
WALKS IN HOME: INDEPENDENT
FEEDING YOURSELF: INDEPENDENT
BATHING: INDEPENDENT
ADEQUATE_TO_COMPLETE_ADL: YES
HEARING - RIGHT EAR: FUNCTIONAL
TOILETING: NEEDS ASSISTANCE

## 2023-11-13 ASSESSMENT — LIFESTYLE VARIABLES
AUDIT-C TOTAL SCORE: 0
HOW OFTEN DO YOU HAVE 6 OR MORE DRINKS ON ONE OCCASION: NEVER
HOW MANY STANDARD DRINKS CONTAINING ALCOHOL DO YOU HAVE ON A TYPICAL DAY: PATIENT DOES NOT DRINK
SKIP TO QUESTIONS 9-10: 1
AUDIT-C TOTAL SCORE: 0
HOW OFTEN DO YOU HAVE A DRINK CONTAINING ALCOHOL: NEVER

## 2023-11-13 ASSESSMENT — COGNITIVE AND FUNCTIONAL STATUS - GENERAL
MOVING FROM LYING ON BACK TO SITTING ON SIDE OF FLAT BED WITH BEDRAILS: A LITTLE
PATIENT BASELINE BEDBOUND: NO
DAILY ACTIVITIY SCORE: 18
CLIMB 3 TO 5 STEPS WITH RAILING: A LITTLE
TOILETING: A LITTLE
MOBILITY SCORE: 18
EATING MEALS: A LITTLE
DRESSING REGULAR LOWER BODY CLOTHING: A LITTLE
TURNING FROM BACK TO SIDE WHILE IN FLAT BAD: A LITTLE
STANDING UP FROM CHAIR USING ARMS: A LITTLE
HELP NEEDED FOR BATHING: A LITTLE
DRESSING REGULAR UPPER BODY CLOTHING: A LITTLE
PERSONAL GROOMING: A LITTLE
WALKING IN HOSPITAL ROOM: A LITTLE
MOVING TO AND FROM BED TO CHAIR: A LITTLE

## 2023-11-13 ASSESSMENT — PATIENT HEALTH QUESTIONNAIRE - PHQ9
2. FEELING DOWN, DEPRESSED OR HOPELESS: NOT AT ALL
SUM OF ALL RESPONSES TO PHQ9 QUESTIONS 1 & 2: 0
1. LITTLE INTEREST OR PLEASURE IN DOING THINGS: NOT AT ALL

## 2023-11-13 ASSESSMENT — PAIN SCALES - GENERAL
PAINLEVEL_OUTOF10: 5 - MODERATE PAIN
PAINLEVEL_OUTOF10: 8
PAINLEVEL_OUTOF10: 5 - MODERATE PAIN
PAINLEVEL_OUTOF10: 8
PAINLEVEL_OUTOF10: 0 - NO PAIN
PAINLEVEL_OUTOF10: 5 - MODERATE PAIN
PAINLEVEL_OUTOF10: 5 - MODERATE PAIN
PAINLEVEL_OUTOF10: 8
PAINLEVEL_OUTOF10: 5 - MODERATE PAIN
PAINLEVEL_OUTOF10: 0 - NO PAIN
PAINLEVEL_OUTOF10: 0 - NO PAIN

## 2023-11-13 ASSESSMENT — COLUMBIA-SUICIDE SEVERITY RATING SCALE - C-SSRS
2. HAVE YOU ACTUALLY HAD ANY THOUGHTS OF KILLING YOURSELF?: NO
6. HAVE YOU EVER DONE ANYTHING, STARTED TO DO ANYTHING, OR PREPARED TO DO ANYTHING TO END YOUR LIFE?: NO
1. IN THE PAST MONTH, HAVE YOU WISHED YOU WERE DEAD OR WISHED YOU COULD GO TO SLEEP AND NOT WAKE UP?: NO

## 2023-11-13 NOTE — CONSULTS
Consults  Acute Pain Service    Martine Cornejo is a 63 y.o. year old female patient who presents for Laparotomy Right Colectomy with Dr. Osuna on 11/13/23. Acute Pain consulted for block for postoperative pain control.     Anticipated Postop Pain Issues -   Palliative: typically relieved with IV analgesics and regional local anesthetics  Provocative: typically with movement  Quality: typically burning and aching  Radiation: typically none  Severity: typically severe 8-10/10  Timing: typically constant    Past Medical History:   Diagnosis Date    Adenocarcinoma of colon (CMS/HCC)     scheduled for surgery with Dr. Osuna on 11/13/2023    Chicken pox     Groin mass     Hyperlipidemia     Mononucleosis     Vision loss      Past Surgical History:   Procedure Laterality Date    COLONOSCOPY       Family History   Problem Relation Name Age of Onset    Coronary artery disease Father       Social History     Tobacco Use    Smoking status: Never    Smokeless tobacco: Never   Vaping Use    Vaping Use: Never used   Substance Use Topics    Alcohol use: Yes     Alcohol/week: 2.0 standard drinks of alcohol     Types: 2 Cans of beer per week     Comment: rare    Drug use: Never     Allergies   Allergen Reactions    Penicillins Unknown         Review of Systems  Gen: No fatigue, anorexia, insomnia, fever.   Eyes: No vision loss, double vision, drainage, eye pain.   ENT: No pharyngitis, dry mouth, no hearing changes or ear discharge  Cardiac: No chest pain, palpitations, syncope, near syncope.   Pulmonary: No shortness of breath, cough, hemoptysis.   Heme/lymph: No swollen glands, fever, bleeding.   GI: No abdominal pain, change in bowel habits, melena, hematemesis, hematochezia, nausea, vomiting, diarrhea.   : No discharge, dysuria, frequency, urgency, hematuria.  Endo: No polyuria or weight loss.   Musculoskeletal: Negative for any pain or loss of ROM/weakness  Skin: No rashes or lesions  Neuro: Normal speech, no numbness or weakness.  No gait difficulties  Review of systems is otherwise negative unless stated above or in history of present illness.    Physical Exam:  Constitutional:  no distress, alert and cooperative  Eyes: clear sclera  Head/Neck: No apparent injury, trachea midline  Respiratory/Thorax: Patent airways, thorax symmetric, breathing comfortably  Cardiovascular: no pitting edema  Gastrointestinal: Nondistended  Musculoskeletal: ROM intact  Extremities: no clubbing  Neurological: alert, garcia x4  Psychological: Appropriate affect    No results found for this or any previous visit (from the past 24 hour(s)).     Plan:    - BL QL blocks performed preoperatively on 11/13/23  - Ambit ball with Ropivacaine 0.2%/NaCl 0.9% 500mL, Rate 7 cc/hr bilaterally  - Ambit medication will not interfere with pain medication prescribed by the primary team.   - Please be aware of local anesthetic toxic dose and absorption variability before considering lidocaine patches  - Acute pain service will follow while catheters in place  - Rest of pain management per primary team    Acute Pain Resident  pg [phone] ph [phone]

## 2023-11-13 NOTE — ANESTHESIA PROCEDURE NOTES
Airway  Date/Time: 11/13/2023 2:23 PM  Urgency: elective    Airway not difficult    Staffing  Performed: PRISCILA   Authorized by: Tommy Almonte MD    Performed by: JHONATAN Murdock  Patient location during procedure: OR    Indications and Patient Condition  Indications for airway management: anesthesia  Spontaneous Ventilation: absent  Sedation level: deep  Preoxygenated: yes  Patient position: sniffing  MILS maintained throughout  Mask difficulty assessment: 1 - vent by mask  Planned trial extubation    Final Airway Details  Final airway type: endotracheal airway      Successful airway: ETT  Cuffed: yes   Successful intubation technique: direct laryngoscopy  Blade: Sukhi  Blade size: #3  ETT size (mm): 7.0  Cormack-Lehane Classification: grade I - full view of glottis  Placement verified by: chest auscultation   Measured from: teeth  ETT to teeth (cm): 21  Number of attempts at approach: 1    Additional Comments  Intubated by Jakob FRANCOIS

## 2023-11-13 NOTE — ANESTHESIA PREPROCEDURE EVALUATION
Patient: Martine Cornejo    Procedure Information       Date/Time: 11/13/23 1105    Procedure: Laparotomy, right colectomy - possible explorartion of right groin with communication to injuinal canal    Location: [facility] OR 06 / [facility] OR    Surgeons: Sven Osuna MD            Relevant Problems   GI   (+) Adenocarcinoma of colon (CMS/HCC)   (+) Cecal cancer (CMS/HCC)   (+) Colon cancer, ascending (CMS/HCC)   (+) Malignant neoplasm of ascending colon (CMS/HCC)   (+) Malignant neoplasm of right colon (CMS/HCC)      GI/Hepatic   (+) Adenocarcinoma of colon (CMS/HCC)   (+) Cecal cancer (CMS/HCC)   (+) Colon cancer, ascending (CMS/HCC)   (+) Malignant neoplasm of ascending colon (CMS/HCC)   (+) Malignant neoplasm of right colon (CMS/HCC)     Vitals:    11/13/23 1012   BP: 128/88   Pulse: 62   Resp: 16   Temp: 37.1 °C (98.8 °F)   SpO2: 98%      Clinical information reviewed:   Tobacco  Allergies  Meds   Med Hx  Surg Hx  OB Status  Fam Hx  Soc   Hx        NPO Detail:  NPO/Void Status  Carbonhydrate Drink Given Prior to Surgery? : N  Date of Last Liquid: 11/13/23  Time of Last Liquid: 0730  Date of Last Solid: 11/11/23  Time of Last Solid: 2000  Last Intake Type: Clear fluids  Time of Last Void: 0900         PHYSICAL EXAM    Anesthesia Plan    ASA 2     general     The patient is not a current smoker.    intravenous induction   Anesthetic plan and risks discussed with patient.  Use of blood products discussed with who consented to blood products.    .Vitals

## 2023-11-13 NOTE — PROCEDURES
Peripheral Block    Patient location during procedure: pre-op  Start time: 11/13/2023 11:30 AM  End time: 11/13/2023 11:45 AM  Reason for block: at surgeon's request  Staffing  Performed: resident   Authorized by: Ita Peralta MD    Performed by: Ita Peralta MD  Preanesthetic Checklist  Completed: patient identified, IV checked, site marked, risks and benefits discussed, surgical consent, monitors and equipment checked, pre-op evaluation and timeout performed   Timeout performed at: 11/13/2023 11:30 AM  Peripheral Block  Patient position: laying flat  Prep: ChloraPrep  Patient monitoring: heart rate and continuous pulse ox  Block type: QL  Injection technique: catheter  Guidance: ultrasound guided  Local infiltration: lidocaine  Needle  Needle type: Tuohy   Needle gauge: 18 G  Needle length: 8 cm  Needle localization: ultrasound guidance     image stored in chart  Assessment  Injection assessment: negative aspiration for heme, no paresthesia on injection, incremental injection and local visualized surrounding nerve on ultrasound  Additional Notes  QL catheters: informed consent obtained. risks and benefits discussed. ASA monitors placed, timeout performed. Pt positioned, prepped with chlorhexidine, draped with sterile towels. Ultrasound guidance used with visualization of the needle throughout duration of the procedure. Aspiration was negative. A total of 30 cc 0.5% ropivacaine, 100mcg epinephrine, and 4mg decadron injected between both sides. catheters threaded into space and secured. Patient tolerated procedure well.       Timeout by RN

## 2023-11-13 NOTE — ANESTHESIA PROCEDURE NOTES
Peripheral IV  Date/Time: 11/13/2023 2:25 PM      Placement  Needle size: 16 G  Laterality: left  Location: hand  Local anesthetic: none  Site prep: alcohol  Technique: anatomical landmarks  Attempts: 1

## 2023-11-13 NOTE — H&P
"History Of Present Illness  Martine Cornejo is a 63 y.o. female presenting for elective open R hemicolectomy with possible stoma and with possible en block abdominal wall resection and R groin exploration for cecal adenocarcinoma (MMR intact) with fistula to R groin. No obvious metastatic disease.      Past Medical History  Past Medical History:   Diagnosis Date    Adenocarcinoma of colon (CMS/HCC)     scheduled for surgery with Dr. Osuna on 11/13/2023    Chicken pox     Groin mass     Hyperlipidemia     Mononucleosis     Vision loss        Surgical History  Past Surgical History:   Procedure Laterality Date    COLONOSCOPY          Social History  She reports that she has never smoked. She has never used smokeless tobacco. She reports current alcohol use of about 2.0 standard drinks of alcohol per week. She reports that she does not use drugs.    Family History  Family History   Problem Relation Name Age of Onset    Coronary artery disease Father          Allergies  Penicillins    Review of Systems     Physical Exam    Physical Exam     Constitutional- no acute distress  Cards- regular rate   Resp- nonlabored breathing on room air   Abdomen- soft, not tender, not distended; R groin with erythematous fistula site without drainage   Extremities- BARRAZA   Skin- warm, dry   Neuro- alert and oriented x3   Psych- appropriate mood   Tubes/lines- PIV      Last Recorded Vitals  Blood pressure 128/88, pulse 62, temperature 37.1 °C (98.8 °F), temperature source Temporal, resp. rate 16, height 1.702 m (5' 7\"), weight 63 kg (139 lb), SpO2 98 %.       Assessment/Plan   Principal Problem:    Malignant neoplasm of right colon (CMS/HCC)  Active Problems:    Malignant neoplasm of ascending colon (CMS/HCC)    Colon cancer, ascending (CMS/HCC)      Pt is 64yo F presenting for elective open R hemicolectomy with possible stoma and with possible en block abdominal wall resection and R groin exploration for cecal adenocarcinoma (MMR intact) with " fistula to R groin. No obvious metastatic disease.           Ruben Sarabia MD  Pager [phone]

## 2023-11-13 NOTE — BRIEF OP NOTE
Date: 2023  OR Location: [facility] OR    Name: Martine Cornejo, : 1960, Age: 63 y.o., MRN: [id number], Sex: female    Diagnosis  Pre-op Diagnosis     * Malignant neoplasm of ascending colon (CMS/HCC) [C18.2]     * Colon cancer, ascending (CMS/HCC) [C18.2] Post-op Diagnosis     * Malignant neoplasm of ascending colon (CMS/HCC) [C18.2]     * Colon cancer, ascending (CMS/HCC) [C18.2]     Procedures  laparatomy, right colectomy en bloc, abdominal wall resection.  34962 - VT COLECTOMY TOT ABD W/PROCTECTOMY ILEOANAL ANAST      Surgeons      * Sven Osuna - Primary    Resident/Fellow/Other Assistant:  Surgeon(s) and Role:Flaca Garza MD, MSc    Procedure Summary  Anesthesia: General  ASA: II  Anesthesia Staff: Anesthesiologist: Tommy Almonte MD  CRNA: CHAUNCEY Alcantara  C-AA: JHONATAN Murdock  SRNA: CHAUNCEY Mcdermott  Estimated Blood Loss: 20mL  Intra-op Medications:   Medication Name Total Dose   heparin (porcine) injection 5,000 Units 5,000 Units   metroNIDAZOLE in NaCl (iso-os) (Flagyl)  mg 500 mg              Anesthesia Record               Intraprocedure I/O Totals          Intake    Propofol Drip 0.00 mL    The total shown is the total volume documented since Anesthesia Start was filed.    ciprofloxacin (Cipro) IVPB 400 mg 200 /mL D5W (premix) 200.00 mL    Total Intake 200 mL       Output    Urine 250 mL    Total Output 250 mL       Net    Net Volume -50 mL          Specimen:   ID Type Source Tests Collected by Time   1 : INGUINAL CANAL Tissue SOFT TISSUE RESECTION SURGICAL PATHOLOGY EXAM Sven Osuna MD 2023 1614   2 : RIGHT COLON Tissue COLON - RIGHT HEMICOLECTOMY SURGICAL PATHOLOGY EXAM Sven Osuna MD 2023 1623   3 : TERMINAL ILEUM Tissue TERMINAL ILEUM WITH RIGHT HEMICOLECTOMY SURGICAL PATHOLOGY EXAM Sven Osuna MD 2023 1627        Staff:   Circulator: Kenzie Collins RN; Tam Mendenhall RN  Relief Circulator: Carley Farias RN; Ysabel Fisher,  RN; David Montemayor, RN  Relief Scrub: Galen Peterson  Scrub Person: Viviana Anand; Mary Alice Vincent          Findings: Dense adhered cecum and appendix to R. Lateral abdominal wall and R. Inguinal canal. Noted 3 subcentimeter lesions on surface of liver.    1st frozen section of inguinal canal, determined to not be malignant.    Complications:  None; patient tolerated the procedure well.     Disposition: PACU - hemodynamically stable.  Condition: stable  Specimens Collected:   ID Type Source Tests Collected by Time   1 : INGUINAL CANAL Tissue SOFT TISSUE RESECTION SURGICAL PATHOLOGY EXAM Sven Osuna MD 11/13/2023 1614   2 : RIGHT COLON Tissue COLON - RIGHT HEMICOLECTOMY SURGICAL PATHOLOGY EXAM Sven Osuna MD 11/13/2023 1623   3 : TERMINAL ILEUM Tissue TERMINAL ILEUM WITH RIGHT HEMICOLECTOMY SURGICAL PATHOLOGY EXAM Sven Osuna MD 11/13/2023 1627     Attending Attestation:       Sven Osuna  Phone Number: [phone]

## 2023-11-14 LAB
ANION GAP SERPL CALC-SCNC: 15 MMOL/L (ref 10–20)
BUN SERPL-MCNC: 12 MG/DL (ref 6–23)
CALCIUM SERPL-MCNC: 9.4 MG/DL (ref 8.6–10.6)
CHLORIDE SERPL-SCNC: 100 MMOL/L (ref 98–107)
CO2 SERPL-SCNC: 26 MMOL/L (ref 21–32)
CREAT SERPL-MCNC: 0.72 MG/DL (ref 0.5–1.05)
ERYTHROCYTE [DISTWIDTH] IN BLOOD BY AUTOMATED COUNT: 13.1 % (ref 11.5–14.5)
GFR SERPL CREATININE-BSD FRML MDRD: >90 ML/MIN/1.73M*2
GLUCOSE SERPL-MCNC: 243 MG/DL (ref 74–99)
HCT VFR BLD AUTO: 39.4 % (ref 36–46)
HGB BLD-MCNC: 12.8 G/DL (ref 12–16)
MCH RBC QN AUTO: 30.4 PG (ref 26–34)
MCHC RBC AUTO-ENTMCNC: 32.5 G/DL (ref 32–36)
MCV RBC AUTO: 94 FL (ref 80–100)
NRBC BLD-RTO: 0 /100 WBCS (ref 0–0)
PLATELET # BLD AUTO: 364 X10*3/UL (ref 150–450)
POTASSIUM SERPL-SCNC: 4.5 MMOL/L (ref 3.5–5.3)
RBC # BLD AUTO: 4.21 X10*6/UL (ref 4–5.2)
SODIUM SERPL-SCNC: 136 MMOL/L (ref 136–145)
WBC # BLD AUTO: 14 X10*3/UL (ref 4.4–11.3)

## 2023-11-14 PROCEDURE — 99232 SBSQ HOSP IP/OBS MODERATE 35: CPT | Performed by: NURSE PRACTITIONER

## 2023-11-14 PROCEDURE — 97165 OT EVAL LOW COMPLEX 30 MIN: CPT | Mod: GO

## 2023-11-14 PROCEDURE — 36415 COLL VENOUS BLD VENIPUNCTURE: CPT | Performed by: STUDENT IN AN ORGANIZED HEALTH CARE EDUCATION/TRAINING PROGRAM

## 2023-11-14 PROCEDURE — 2500000001 HC RX 250 WO HCPCS SELF ADMINISTERED DRUGS (ALT 637 FOR MEDICARE OP): Performed by: STUDENT IN AN ORGANIZED HEALTH CARE EDUCATION/TRAINING PROGRAM

## 2023-11-14 PROCEDURE — 97161 PT EVAL LOW COMPLEX 20 MIN: CPT | Mod: GP

## 2023-11-14 PROCEDURE — 85027 COMPLETE CBC AUTOMATED: CPT | Performed by: STUDENT IN AN ORGANIZED HEALTH CARE EDUCATION/TRAINING PROGRAM

## 2023-11-14 PROCEDURE — 99231 SBSQ HOSP IP/OBS SF/LOW 25: CPT | Performed by: STUDENT IN AN ORGANIZED HEALTH CARE EDUCATION/TRAINING PROGRAM

## 2023-11-14 PROCEDURE — 2500000004 HC RX 250 GENERAL PHARMACY W/ HCPCS (ALT 636 FOR OP/ED): Performed by: STUDENT IN AN ORGANIZED HEALTH CARE EDUCATION/TRAINING PROGRAM

## 2023-11-14 PROCEDURE — 80048 BASIC METABOLIC PNL TOTAL CA: CPT | Performed by: STUDENT IN AN ORGANIZED HEALTH CARE EDUCATION/TRAINING PROGRAM

## 2023-11-14 PROCEDURE — 96372 THER/PROPH/DIAG INJ SC/IM: CPT | Performed by: STUDENT IN AN ORGANIZED HEALTH CARE EDUCATION/TRAINING PROGRAM

## 2023-11-14 PROCEDURE — 1170000001 HC PRIVATE ONCOLOGY ROOM DAILY

## 2023-11-14 RX ORDER — GABAPENTIN 300 MG/1
300 CAPSULE ORAL EVERY 8 HOURS SCHEDULED
Status: DISCONTINUED | OUTPATIENT
Start: 2023-11-14 | End: 2023-11-16 | Stop reason: HOSPADM

## 2023-11-14 RX ORDER — ACETAMINOPHEN 325 MG/1
975 TABLET ORAL EVERY 6 HOURS
Status: DISCONTINUED | OUTPATIENT
Start: 2023-11-14 | End: 2023-11-16 | Stop reason: HOSPADM

## 2023-11-14 RX ADMIN — ENOXAPARIN SODIUM 40 MG: 100 INJECTION SUBCUTANEOUS at 21:10

## 2023-11-14 RX ADMIN — OXYCODONE HYDROCHLORIDE 10 MG: 5 TABLET ORAL at 01:39

## 2023-11-14 RX ADMIN — GABAPENTIN 300 MG: 300 CAPSULE ORAL at 13:27

## 2023-11-14 RX ADMIN — ACETAMINOPHEN 975 MG: 325 TABLET ORAL at 23:12

## 2023-11-14 RX ADMIN — OXYCODONE HYDROCHLORIDE 10 MG: 5 TABLET ORAL at 13:27

## 2023-11-14 RX ADMIN — OXYCODONE HYDROCHLORIDE 10 MG: 5 TABLET ORAL at 06:28

## 2023-11-14 RX ADMIN — ACETAMINOPHEN 975 MG: 325 TABLET ORAL at 13:27

## 2023-11-14 RX ADMIN — SIMVASTATIN 20 MG: 20 TABLET, FILM COATED ORAL at 23:12

## 2023-11-14 RX ADMIN — GABAPENTIN 300 MG: 300 CAPSULE ORAL at 21:10

## 2023-11-14 ASSESSMENT — PAIN - FUNCTIONAL ASSESSMENT
PAIN_FUNCTIONAL_ASSESSMENT: 0-10

## 2023-11-14 ASSESSMENT — COGNITIVE AND FUNCTIONAL STATUS - GENERAL
MOVING FROM LYING ON BACK TO SITTING ON SIDE OF FLAT BED WITH BEDRAILS: A LITTLE
MOBILITY SCORE: 18
CLIMB 3 TO 5 STEPS WITH RAILING: A LITTLE
MOVING TO AND FROM BED TO CHAIR: A LITTLE
HELP NEEDED FOR BATHING: A LITTLE
MOBILITY SCORE: 18
DAILY ACTIVITIY SCORE: 24
MOVING FROM LYING ON BACK TO SITTING ON SIDE OF FLAT BED WITH BEDRAILS: A LITTLE
TURNING FROM BACK TO SIDE WHILE IN FLAT BAD: A LITTLE
WALKING IN HOSPITAL ROOM: A LITTLE
TURNING FROM BACK TO SIDE WHILE IN FLAT BAD: A LITTLE
STANDING UP FROM CHAIR USING ARMS: A LITTLE
MOVING TO AND FROM BED TO CHAIR: A LITTLE
TOILETING: A LITTLE
CLIMB 3 TO 5 STEPS WITH RAILING: A LITTLE
WALKING IN HOSPITAL ROOM: A LITTLE
STANDING UP FROM CHAIR USING ARMS: A LITTLE
DAILY ACTIVITIY SCORE: 22

## 2023-11-14 ASSESSMENT — PAIN SCALES - GENERAL
PAINLEVEL_OUTOF10: 3
PAINLEVEL_OUTOF10: 7
PAINLEVEL_OUTOF10: 5 - MODERATE PAIN
PAINLEVEL_OUTOF10: 7
PAINLEVEL_OUTOF10: 5 - MODERATE PAIN
PAINLEVEL_OUTOF10: 8
PAINLEVEL_OUTOF10: 8

## 2023-11-14 ASSESSMENT — ACTIVITIES OF DAILY LIVING (ADL)
LACK_OF_TRANSPORTATION: NO
ADL_ASSISTANCE: INDEPENDENT
ADL_ASSISTANCE: INDEPENDENT

## 2023-11-14 NOTE — ANESTHESIA POSTPROCEDURE EVALUATION
Patient: Martine Cornejo    Procedure Summary       Date: 11/13/23 Room / Location: [facility] OR 06 / [facility] OR    Anesthesia Start: 1342 Anesthesia Stop: 1735    Procedure: laparatomy, right colectomy en bloc abdominal wall resection. Diagnosis:       Malignant neoplasm of ascending colon (CMS/HCC)      Colon cancer, ascending (CMS/HCC)      (Malignant neoplasm of ascending colon (CMS/HCC) [C18.2])      (Colon cancer, ascending (CMS/HCC) [C18.2])    Surgeons: Sven Osuna MD Responsible Provider: Tommy Almonte MD    Anesthesia Type: general ASA Status: 2            Anesthesia Type: general    Vitals Value Taken Time   /86 11/13/23 2000   Temp 36.3 °C (97.3 °F) 11/13/23 2000   Pulse 75 11/13/23 2034   Resp 8 11/13/23 1939   SpO2 98 % 11/13/23 2034   Vitals shown include unvalidated device data.    Anesthesia Post Evaluation    No notable events documented.

## 2023-11-14 NOTE — CARE PLAN
The patient's goals for the shift include      The clinical goals for the shift include pt will remain safe and use call light

## 2023-11-14 NOTE — SIGNIFICANT EVENT
POST-OP CHECK NOTE    S:  Ms. Martine Cornejo is a 63 y.o. F POD 0 s/p elective open R hemicolectomy with  en bloc abdominal wall resection  for cecal adenocarcinoma (MMR intact) with fistula to R groin. No obvious metastatic disease found on frozen samples.    Patient reports that her pain is currently well controlled and she mainly feels discomfort surrounding her incision. She denies any headaches, N/V, chest pain, SOB, weakness, dizziness, lightheadedness. She states that she is not short of breathe but is wearing 1 L NC for comfort.     O:  Vitals:    11/13/23 2105   BP: 154/82   Pulse: 75   Resp: 14   Temp: 36.3 °C (97.3 °F)   SpO2: 99%        PE:  General: NAD, laying in bed comfortably.  Cardiovascular: RRR on vitals monitor at bedside vitals check, capillary refill <3 sec  Respiratory: breathing comfortably on 1 L NC, no wheezes or crackles on auscultation  Abdomen: Soft, appropriately tender to palpation, midline is covered with island.  dressing with mild sanguinous strikethrough (marked at beside). Ambit wires on bilateral lateral abdomen.  GI: purewick in place, no urine noted in canister  MSK: strength 5/5, iROM  Neuro: no focal deficits    A/P:   63 y.o. F POD 0 s/p elective open R hemicolectomy with  en bloc abdominal wall resection recovering well post-operatively.    -pain control with Oxy and tylenol PRN  -zofran for nausea PRN  -encourage ambulation as tolerated  -wean supp O2 as tolerated and encourage IS use  -Ok for CLD, will make NPO if there is increasing nausea with emesis  -continue to monitor for changes in abdominal exam  -follow-up TOV at 11 pm with bladder scan if no voids  at this time  -dvt ppx: lovenox    Kimberly Kirkland MD  PGY1 Colorectal Surgery  [phone]    ___________________________________  2250: patient has not yet voided and does not feel the urge to void. Will obtain a bladder scan at this time. Scan showed 324 in bladder, will straight cath this time and repeat TOV and reassess in  4-6 hours.    Kimberly Kirkland MD  PGY1 Colorectal Surgery  [phone]

## 2023-11-14 NOTE — CARE PLAN
The patient's goals for the shift include      The clinical goals for the shift include pt will remain safe and use call light    Over the shift, the patient did not make progress toward the following goals. Barriers to progression include none. Recommendations to address these barriers include none  Problem: Pain  Goal: My pain/discomfort is manageable  Outcome: Progressing     Problem: Safety  Goal: Patient will be injury free during hospitalization  Outcome: Progressing  Goal: I will remain free of falls  Outcome: Progressing     Problem: Daily Care  Goal: Daily care needs are met  Outcome: Progressing     Problem: Psychosocial Needs  Goal: Demonstrates ability to cope with hospitalization/illness  Outcome: Progressing  Goal: Collaborate with me, my family, and caregiver to identify my specific goals  Outcome: Progressing     Problem: Discharge Barriers  Goal: My discharge needs are met  Outcome: Progressing   .

## 2023-11-14 NOTE — NURSING NOTE
Patient unable to void in due time. Bladder scanned 324. Provider made aware. Order to straight cath received.

## 2023-11-14 NOTE — PROGRESS NOTES
Occupational Therapy    Occupational Therapy    Evaluation    Patient Name: Martine Cornejo  MRN: [id number]  Today's Date: 11/14/2023  Time Calculation  Start Time: 0930  Stop Time: 0939  Time Calculation (min): 9 min    Assessment  IP OT Assessment  OT Assessment: Pt demonstrated near baseline functional performance of ADLs and functional mobility post surgery. No further OT needs  End of Session Patient Position: Up in chair  Plan:  No Skilled OT: No acute OT goals identified  OT Discharge Recommendations: No OT needed after discharge, No further acute OT  Equipment Recommended upon Discharge:  (shower chair)  OT - OK to Discharge: Yes (eval completed. see discharge recommendation)    Subjective   Current Problem:  1. Cecal cancer (CMS/HCC)        2. Malignant neoplasm of right colon (CMS/HCC)        3. Malignant neoplasm of ascending colon (CMS/HCC)  Surgical Pathology Exam    Surgical Pathology Exam      4. Colon cancer, ascending (CMS/HCC)  Surgical Pathology Exam    Surgical Pathology Exam        General:  Reason for Referral: s/p elective open R hemicolectomy with  en bloc abdominal wall resection  for cecal adenocarcinoma (MMR intact) with fistula to R groin  Past Medical History Relevant to Rehab: 63 y.o. female presenting for elective open R hemicolectomy with possible stoma and with possible en block abdominal wall resection and R groin exploration for cecal adenocarcinoma (MMR intact) with fistula to R groin  Prior to Session Communication: Bedside nurse  Patient Position Received: Up in bathroom  Family/Caregiver Present: Yes  Caregiver Feedback: dtr present and very supportive  General Comment: pleasant and cooperative, agreeable to OT   Precautions:  Medical Precautions: Abdominal precautions     Pain:  Pain Assessment  Pain Assessment: 0-10  Pain Score:  (Pt did not c/o of pain)  Lines/Tubes/Drains:  IV       Objective   Cognition:  Overall Cognitive Status: Within Functional Limits  Orientation Level:  Oriented X4  Safety/Judgement: Within Functional Limits           Home Living:  Type of Home: House  Lives With: Spouse  Home Adaptive Equipment: Crutches  Home Layout: One level, Full bath main level  Home Access: Stairs to enter without rails  Entrance Stairs-Number of Steps: 2  Bathroom Shower/Tub: Tub/shower unit  Bathroom Toilet: Standard  Bathroom Equipment: None   Prior Function:  Level of [place]: Independent with ADLs and functional transfers  ADL Assistance: Independent  Homemaking Assistance: Independent  Ambulatory Assistance: Independent  Vocational: Full time employment  Hand Dominance: Right  IADL History:  Current License: Yes  Mode of Transportation: Car  Occupation: Full time employment  Type of Occupation:   ADL:  Eating Deficit:  (set up only anticipated)  Grooming Deficit:  (Pt washed hands standing sink side in supervision)  Bathing Deficit:  (supervision at least seated 2/2 clothing management and functional mobility)  UE Dressing Deficit:  (IND anticipated)  LE Dressing Deficit:  (IND anticipated; Pt able to bring B feet to self in 4 figure position)  Toileting Deficit:  (Pt performed toileting upon arrival supervision from dtr, supervision pericare, supervision transfers)  Functional Deficit:  (Pt performe dfunctional mobility in bathroom to chair supervision no AD no LOB)  Activity Tolerance:  Endurance: Endurance does not limit participation in activity  Bed Mobility/Transfers: Bed Mobility  Bed Mobility: No   and Transfers  Transfer: Yes  Transfer 1  Transfer From 1: Stand to  Transfer to 1: Chair with arms  Technique 1: Stand to sit  Transfer Level of Assistance 1: Distant supervision  Trials/Comments 1: 1    IADL's:   Current License: Yes  Mode of Transportation: Car  Occupation: Full time employment  Type of Occupation:   Vision: Vision - Basic Assessment  Current Vision: No visual deficits    Sensation:  Light Touch: No apparent deficits    Hand  Function:  Hand Function  Gross Grasp: Functional  Coordination: Functional  Extremities: RUE   RUE : Within Functional Limits, LUE   LUE: Within Functional Limits,  ,     Outcome Measures: [facility] Daily Activity  Putting on and taking off regular lower body clothing: None  Bathing (including washing, rinsing, drying): A little  Putting on and taking off regular upper body clothing: None  Toileting, which includes using toilet, bedpan or urinal: A little  Taking care of personal grooming such as brushing teeth: None  Eating Meals: None  Daily Activity - Total Score: 22         ,     OT Adult Other Outcome Measures  4AT: 4AT-    Education Documentation  Precautions, taught by Sarai Viera OT at 11/14/2023 10:07 AM.  Learner: Patient  Readiness: Acceptance  Method: Explanation  Response: Verbalizes Understanding  Comment: OT educated Pt on abdominal prec, adaptive techniques, energy conservation techniques, and safety while performing ADLs and functional mobility    ADL Training, taught by Sarai Viera OT at 11/14/2023 10:07 AM.  Learner: Patient  Readiness: Acceptance  Method: Explanation  Response: Verbalizes Understanding  Comment: OT educated Pt on abdominal prec, adaptive techniques, energy conservation techniques, and safety while performing ADLs and functional mobility        11/14/23 at 10:08 AM   Sarai Viera OT   Rehab Office: [phone]

## 2023-11-14 NOTE — PROGRESS NOTES
Acute Pain Service    Postop Pain HPI -   Palliative: relieved with IV analgesics and regional local anesthetics  Provocative: movement  Quality:  burning and aching  Radiation:  none  Severity:  5-8/10 (depending on movement)  Timing: constant    24-HOUR OPIOID CONSUMPTION:  30mg Oxycodone    Scheduled medications  acetaminophen, 975 mg, oral, q6h  enoxaparin, 40 mg, subcutaneous, q24h  gabapentin, 300 mg, oral, q8h RICHARD  simvastatin, 20 mg, oral, Nightly      Continuous medications  dextrose 5%-0.45 % sodium chloride, 40 mL/hr, Last Rate: 40 mL/hr (11/14/23 0451)  ropivacaine (PF) in NS cmpd, 6 mL/hr      PRN medications  PRN medications: naloxone, ondansetron ODT **OR** ondansetron, oxyCODONE, oxyCODONE, oxygen     Physical Exam:  Constitutional:  no distress, alert and cooperative  Eyes: clear sclera  Head/Neck: No apparent injury, trachea midline  Respiratory/Thorax: Patent airways, thorax symmetric, breathing comfortably  Cardiovascular: no pitting edema  Gastrointestinal: Nondistended  Musculoskeletal: ROM intact  Extremities: no clubbing  Neurological: alert, garcia x4  Psychological: Appropriate affect    Results for orders placed or performed during the hospital encounter of 11/13/23 (from the past 24 hour(s))   Type and screen   Result Value Ref Range    ABO TYPE O     Rh TYPE POS     ANTIBODY SCREEN NEG    Sars-CoV-2 PCR, Screen Asymptomatic   Result Value Ref Range    Coronavirus 2019, PCR Not Detected Not Detected      Plan:     - BL QL blocks performed preoperatively on 11/13/23  - Ambit ball with Ropivacaine 0.2%/NaCl 0.9% 500mL, Rate 7 cc/hr bilaterally  - Ambit medication will not interfere with pain medication prescribed by the primary team.   - Please be aware of local anesthetic toxic dose and absorption variability before considering lidocaine patches  - Acute pain service will follow while catheters in place  - Rest of pain management per primary team     Acute Pain Resident  pg [phone] ph [phone]

## 2023-11-14 NOTE — PROGRESS NOTES
"Martine Cornejo is a 63 y.o. female on day 1 of admission presenting with Malignant neoplasm of right colon (CMS/HCC).    Subjective   No acute events overnight, afebrile with stable vital signs.  Looks well this am, pain controlled  Straight cath x 1 overnight but able to void       Objective     Physical Exam  Constitutional:       Appearance: Normal appearance.   HENT:      Head: Normocephalic and atraumatic.      Nose: Nose normal.      Mouth/Throat:      Mouth: Mucous membranes are moist.   Eyes:      Extraocular Movements: Extraocular movements intact.      Conjunctiva/sclera: Conjunctivae normal.   Cardiovascular:      Rate and Rhythm: Normal rate and regular rhythm.      Pulses: Normal pulses.   Pulmonary:      Effort: Pulmonary effort is normal.      Breath sounds: Normal breath sounds.   Abdominal:      Palpations: Abdomen is soft.      Comments: Appropriately tender, no guarding or rebound tenderness, surgical dressing intact with strike through   Musculoskeletal:         General: Normal range of motion.   Skin:     General: Skin is warm and dry.   Neurological:      General: No focal deficit present.      Mental Status: She is alert and oriented to person, place, and time.   Psychiatric:         Mood and Affect: Mood normal.         Behavior: Behavior normal.         Last Recorded Vitals  Blood pressure 137/80, pulse 69, temperature 37 °C (98.6 °F), temperature source Temporal, resp. rate 16, height 1.702 m (5' 7\"), weight 63 kg (139 lb), SpO2 97 %.  Intake/Output last 3 Shifts:  I/O last 3 completed shifts:  In: 2104 (33.4 mL/kg) [I.V.:304 (4.8 mL/kg); IV Piggyback:1800]  Out: 1000 (15.9 mL/kg) [Urine:1000 (0.4 mL/kg/hr)]  Weight: 63 kg     Relevant Results    Scheduled medications  acetaminophen, 975 mg, oral, q6h  enoxaparin, 40 mg, subcutaneous, q24h  gabapentin, 300 mg, oral, q8h RICHARD  simvastatin, 20 mg, oral, Nightly      Continuous medications  dextrose 5%-0.45 % sodium chloride, 40 mL/hr, Last Rate: " 40 mL/hr (11/14/23 0451)  ropivacaine (PF) in NS cmpd, 6 mL/hr      PRN medications  PRN medications: naloxone, ondansetron ODT **OR** ondansetron, oxyCODONE, oxyCODONE, oxygen    Results for orders placed or performed during the hospital encounter of 11/13/23 (from the past 24 hour(s))   Type and screen   Result Value Ref Range    ABO TYPE O     Rh TYPE POS     ANTIBODY SCREEN NEG    Sars-CoV-2 PCR, Screen Asymptomatic   Result Value Ref Range    Coronavirus 2019, PCR Not Detected Not Detected   CBC   Result Value Ref Range    WBC 14.0 (H) 4.4 - 11.3 x10*3/uL    nRBC 0.0 0.0 - 0.0 /100 WBCs    RBC 4.21 4.00 - 5.20 x10*6/uL    Hemoglobin 12.8 12.0 - 16.0 g/dL    Hematocrit 39.4 36.0 - 46.0 %    MCV 94 80 - 100 fL    MCH 30.4 26.0 - 34.0 pg    MCHC 32.5 32.0 - 36.0 g/dL    RDW 13.1 11.5 - 14.5 %    Platelets 364 150 - 450 x10*3/uL                            Assessment/Plan   Principal Problem:    Malignant neoplasm of right colon (CMS/HCC)  Active Problems:    Cecal cancer (CMS/HCC)    Malignant neoplasm of ascending colon (CMS/HCC)    Colon cancer, ascending (CMS/HCC)    Plans for today:  Continue clear liquids as tolerated  Monitor urine output  Transfer to [facility]    Neuro:   - Tylenol and Gabapentin per ERAS  - multimodal pain control with:with oral oxycodone, QL catheters per acute pain    Cardiac: Hemodynamically stable, Hx ofHLD  -Q4VS          - Continue home Simvastatin                                                                                                                                                                  Pulm: no acute issues, continues on room air  - Incentive spirometry Q 1 hours  - Encourage Ambulation  - OOB     GI: Hx of cecal cancer, s/p Right colectomy  - Ensure Surgery  - Clear liquids as tolerated  - Zofran prn for nausea    : straight cath x 1 overnight, voiding spontaneously  - Strict intake and output  - Trend renal function and replace electrolytes as needed  - Continue  IVF    HEME no evidence of active bleeding  - trend CBC  - in indication for transfusion at this time    Endo- no issues, no indication for SSI    ID afebrile  - No indication for ATB    Proph  - Continue SCDs, currently on Heparin - will convert to Lovenox for DVT prevention    Dispo:  - Continue regular nursing floor  - plan at discharge... home with no needs when ready          ROCHELLE RICK-CNP  Colorectal Surgery NP #[id number]  [place] # [id number]

## 2023-11-14 NOTE — PROGRESS NOTES
Physical Therapy    Physical Therapy Evaluation    Patient Name: Martine Cornejo  MRN: [id number]  Today's Date: 11/14/2023   Time Calculation  Start Time: 0833  Stop Time: 0850  Time Calculation (min): 17 min    Assessment/Plan   PT Assessment  PT Assessment Results: Decreased endurance  Rehab Prognosis: Excellent  Medical Staff Made Aware: Yes  End of Session Communication: Bedside nurse  Assessment Comment: pt cooperative and pleasant, tolerated session well. great support system at dc. likely dc PT orders after 1 more session of increased ambulation and complete 2 stairs for dc home  End of Session Patient Position: Up in chair  IP OR SWING BED PT PLAN  Inpatient or Swing Bed: Inpatient  PT Plan  Treatment/Interventions: Bed mobility, Transfer training, Gait training, Stair training, Balance training, Strengthening, Endurance training, Range of motion, Therapeutic exercise, Therapeutic activity  PT Plan: Skilled PT  PT Frequency: One time follow up visit  PT Discharge Recommendations: No PT needed after discharge  PT - OK to Discharge: Yes      Subjective   General Visit Information:  General  Reason for Referral: s/p elective open R hemicolectomy with  en bloc abdominal wall resection  for 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  Home Living:  Home Living  Type of Home: House  Lives With: Spouse  Home Adaptive Equipment: Crutches  Home Layout: One level  Home Access: Stairs to enter without rails  Entrance Stairs-Number of Steps: 2  Prior Level of Function:  Prior Function Per Pt/Caregiver Report  Level of  Keller: Independent with ADLs and functional transfers, Independent with homemaking with ambulation  Receives Help From:  (indep at baseline)  ADL Assistance: Independent  Homemaking Assistance: Independent  Ambulatory Assistance: Independent  Vocational: Full time employment ()  Precautions:  Precautions  Medical Precautions: Fall precautions  Post-Surgical Precautions: Abdominal surgery precautions  Vital Signs:       Objective   Pain:  Pain Assessment  Pain Assessment: 0-10  Pain Score: 5 - Moderate pain  Pain Type:  (discomfort)  Cognition:  Cognition  Overall Cognitive Status: Within Functional Limits  Orientation Level: Oriented X4    General Assessments:      Activity Tolerance  Endurance: Endurance does not limit participation in activity    Sensation  Light Touch: No apparent deficits       Functional Assessments:  Bed Mobility  Bed Mobility: Yes  Bed Mobility 1  Bed Mobility 1: Supine to sitting, Log roll  Level of Assistance 1: Contact guard  Bed Mobility Comments 1: edu on log roll for bed mobiltiy, CGA to complete    Transfers  Transfer: Yes  Transfer 1  Transfer From 1: Sit to, Stand to  Transfer to 1: Stand, Sit  Technique 1: Sit to stand, Stand to sit  Transfer Level of Assistance 1: Close supervision  Trials/Comments 1: pt stood from EOB with no AD    Ambulation/Gait Training  Ambulation/Gait Training Performed: Yes  Ambulation/Gait Training 1  Surface 1: Level tile  Device 1: No device  Assistance 1: Contact guard  Comments/Distance (ft) 1: pt ambulated in quiñones 200' with no AD, steady  Extremity/Trunk Assessments:  RLE   RLE : Within Functional Limits  LLE   LLE : Within Functional Limits  Outcome Measures:  [facility] Basic Mobility  Turning from your back to your side while in a flat bed without using bedrails: A little  Moving from lying on your back to sitting on the side of a flat bed without using bedrails: A little  Moving to and from bed to chair (including a wheelchair): A  little  Standing up from a chair using your arms (e.g. wheelchair or bedside chair): A little  To walk in hospital room: A little  Climbing 3-5 steps with railing: A little  Basic Mobility - Total Score: 18    Encounter Problems       Encounter Problems (Active)       Mobility       STG - Patient will ambulate 500' indep LRAD (Progressing)       Start:  11/14/23    Expected End:  11/28/23            STG - Patient will ascend and descend 2 steps supervision  (Progressing)       Start:  11/14/23    Expected End:  11/28/23               Transfers       STG - Patient will perform bed mobility indep  (Progressing)       Start:  11/14/23    Expected End:  11/28/23            STG - Patient will transfer sit to and from stand indep LRAD (Progressing)       Start:  11/14/23    Expected End:  11/28/23                   Education Documentation  Precautions, taught by Aster Steve PT at 11/14/2023 11:00 AM.  Learner: Patient  Readiness: Acceptance  Method: Explanation  Response: Verbalizes Understanding  Comment: edu pt and dtr on precautions, role of PT, dc plan and OOB to chair    Mobility Training, taught by Aster Steve PT at 11/14/2023 11:00 AM.  Learner: Patient  Readiness: Acceptance  Method: Explanation  Response: Verbalizes Understanding  Comment: edu pt and dtr on precautions, role of PT, dc plan and OOB to chair    Education Comments  No comments found.

## 2023-11-15 LAB
ALBUMIN SERPL BCP-MCNC: 3.6 G/DL (ref 3.4–5)
ANION GAP SERPL CALC-SCNC: 13 MMOL/L (ref 10–20)
BUN SERPL-MCNC: 7 MG/DL (ref 6–23)
CALCIUM SERPL-MCNC: 9.4 MG/DL (ref 8.6–10.6)
CHLORIDE SERPL-SCNC: 105 MMOL/L (ref 98–107)
CO2 SERPL-SCNC: 27 MMOL/L (ref 21–32)
CREAT SERPL-MCNC: 0.64 MG/DL (ref 0.5–1.05)
ERYTHROCYTE [DISTWIDTH] IN BLOOD BY AUTOMATED COUNT: 13.4 % (ref 11.5–14.5)
GFR SERPL CREATININE-BSD FRML MDRD: >90 ML/MIN/1.73M*2
GLUCOSE SERPL-MCNC: 126 MG/DL (ref 74–99)
HCT VFR BLD AUTO: 38.1 % (ref 36–46)
HGB BLD-MCNC: 12.2 G/DL (ref 12–16)
MAGNESIUM SERPL-MCNC: 2.19 MG/DL (ref 1.6–2.4)
MCH RBC QN AUTO: 30 PG (ref 26–34)
MCHC RBC AUTO-ENTMCNC: 32 G/DL (ref 32–36)
MCV RBC AUTO: 94 FL (ref 80–100)
NRBC BLD-RTO: 0 /100 WBCS (ref 0–0)
PHOSPHATE SERPL-MCNC: 1.8 MG/DL (ref 2.5–4.9)
PLATELET # BLD AUTO: 304 X10*3/UL (ref 150–450)
POTASSIUM SERPL-SCNC: 4 MMOL/L (ref 3.5–5.3)
RBC # BLD AUTO: 4.06 X10*6/UL (ref 4–5.2)
SODIUM SERPL-SCNC: 141 MMOL/L (ref 136–145)
WBC # BLD AUTO: 11.4 X10*3/UL (ref 4.4–11.3)

## 2023-11-15 PROCEDURE — 85027 COMPLETE CBC AUTOMATED: CPT

## 2023-11-15 PROCEDURE — 2500000001 HC RX 250 WO HCPCS SELF ADMINISTERED DRUGS (ALT 637 FOR MEDICARE OP): Performed by: STUDENT IN AN ORGANIZED HEALTH CARE EDUCATION/TRAINING PROGRAM

## 2023-11-15 PROCEDURE — 96372 THER/PROPH/DIAG INJ SC/IM: CPT | Performed by: STUDENT IN AN ORGANIZED HEALTH CARE EDUCATION/TRAINING PROGRAM

## 2023-11-15 PROCEDURE — 80069 RENAL FUNCTION PANEL: CPT

## 2023-11-15 PROCEDURE — 83735 ASSAY OF MAGNESIUM: CPT

## 2023-11-15 PROCEDURE — 99231 SBSQ HOSP IP/OBS SF/LOW 25: CPT | Performed by: STUDENT IN AN ORGANIZED HEALTH CARE EDUCATION/TRAINING PROGRAM

## 2023-11-15 PROCEDURE — 36415 COLL VENOUS BLD VENIPUNCTURE: CPT

## 2023-11-15 PROCEDURE — 1170000001 HC PRIVATE ONCOLOGY ROOM DAILY

## 2023-11-15 PROCEDURE — 2500000004 HC RX 250 GENERAL PHARMACY W/ HCPCS (ALT 636 FOR OP/ED): Performed by: STUDENT IN AN ORGANIZED HEALTH CARE EDUCATION/TRAINING PROGRAM

## 2023-11-15 RX ORDER — ENOXAPARIN SODIUM 100 MG/ML
40 INJECTION SUBCUTANEOUS EVERY 24 HOURS
Qty: 10 ML | Refills: 0 | Status: SHIPPED | OUTPATIENT
Start: 2023-11-15 | End: 2023-12-11

## 2023-11-15 RX ORDER — DEXTROSE MONOHYDRATE AND SODIUM CHLORIDE 5; .45 G/100ML; G/100ML
10 INJECTION, SOLUTION INTRAVENOUS CONTINUOUS
Status: DISCONTINUED | OUTPATIENT
Start: 2023-11-15 | End: 2023-11-15

## 2023-11-15 RX ORDER — OXYCODONE HYDROCHLORIDE 5 MG/1
5 TABLET ORAL EVERY 6 HOURS PRN
Qty: 28 TABLET | Refills: 0 | Status: SHIPPED | OUTPATIENT
Start: 2023-11-15 | End: 2023-11-23

## 2023-11-15 RX ADMIN — GABAPENTIN 300 MG: 300 CAPSULE ORAL at 21:35

## 2023-11-15 RX ADMIN — GABAPENTIN 300 MG: 300 CAPSULE ORAL at 05:21

## 2023-11-15 RX ADMIN — DEXTROSE AND SODIUM CHLORIDE 40 ML/HR: 5; 450 INJECTION, SOLUTION INTRAVENOUS at 05:21

## 2023-11-15 RX ADMIN — OXYCODONE HYDROCHLORIDE 5 MG: 5 TABLET ORAL at 09:08

## 2023-11-15 RX ADMIN — GABAPENTIN 300 MG: 300 CAPSULE ORAL at 13:36

## 2023-11-15 RX ADMIN — OXYCODONE HYDROCHLORIDE 10 MG: 5 TABLET ORAL at 17:55

## 2023-11-15 RX ADMIN — ACETAMINOPHEN 975 MG: 325 TABLET ORAL at 05:21

## 2023-11-15 RX ADMIN — ENOXAPARIN SODIUM 40 MG: 100 INJECTION SUBCUTANEOUS at 21:35

## 2023-11-15 RX ADMIN — ACETAMINOPHEN 975 MG: 325 TABLET ORAL at 17:55

## 2023-11-15 RX ADMIN — SIMVASTATIN 20 MG: 20 TABLET, FILM COATED ORAL at 21:35

## 2023-11-15 RX ADMIN — ACETAMINOPHEN 975 MG: 325 TABLET ORAL at 11:05

## 2023-11-15 ASSESSMENT — PAIN SCALES - GENERAL
PAINLEVEL_OUTOF10: 2
PAINLEVEL_OUTOF10: 6
PAINLEVEL_OUTOF10: 7
PAINLEVEL_OUTOF10: 4

## 2023-11-15 ASSESSMENT — PAIN - FUNCTIONAL ASSESSMENT
PAIN_FUNCTIONAL_ASSESSMENT: 0-10
PAIN_FUNCTIONAL_ASSESSMENT: 0-10

## 2023-11-15 ASSESSMENT — PAIN DESCRIPTION - DESCRIPTORS: DESCRIPTORS: ACHING

## 2023-11-15 NOTE — PROGRESS NOTES
Colorectal Surgery Progress Note    Reason for admission: elective surgery and is now s/p right hemicolectomy.    HPI:     Subjective  No acute events overnight.  Pain well controlled  Nausea well controlled, has not vomited  Denies passing gas, not having bowel movements  Voiding freely without issue  Ambulating independently    Objective  Physical Exam:  Gen: in no apparent distress  Resp: has a normal respiratory effort  Abd: Abdomen is soft, appropriately tender to palpation, and distended. Midline incision is clean, dry and intact.    Skin: Warm and dry  Extremities: BARRAZA x 4    I/O last 3 completed shifts:  In: 990 (15.7 mL/kg) [P.O.:360; I.V.:630 (10 mL/kg)]  Out: 1669 (26.5 mL/kg) [Urine:1669 (0.7 mL/kg/hr)]  Weight: 63 kg   I/O this shift:  In: 240 [P.O.:240]  Out: 200 [Urine:200]    Data Review:  CBC:   Lab Results   Component Value Date    WBC 11.4 (H) 11/15/2023    RBC 4.06 11/15/2023     BMP:   Lab Results   Component Value Date    GLUCOSE 243 (H) 11/14/2023    CO2 26 11/14/2023    BUN 12 11/14/2023    CREATININE 0.72 11/14/2023    CALCIUM 9.4 11/14/2023     Imaging:  Reviewed    Assessment: 63F with a history of HLD who is s/p open R hemicolectomy with abdominal wall mass resection for large cecal mass on 11/13. Progressing appropriately postoperatively.     Plan:  Continue pain control with scheduled Tylenol/gabapentin, PRN oxycodone, pain catheters in place  Continue home statin, Q4 VS  IS 10x/hr  OK for patient regulated low fiber diet, AROBF, Zofran PRN   HLIVF, trend labs, monitor UOP   OOB/ambulate, LVX daily     Plans discussed with staff, Dr. Osuna.    Devin Pemberton MD  Colorectal Surgery  [place] [facility] Pager [phone]

## 2023-11-15 NOTE — PROGRESS NOTES
Acute Pain Service    Postop Pain HPI -   Palliative: relieved with IV analgesics and regional local anesthetics  Provocative: movement  Quality:  burning and aching  Radiation:  none  Severity:  5/10  Timing: constant    24-HOUR OPIOID CONSUMPTION:  30mg Oxycodone    Scheduled medications  acetaminophen, 975 mg, oral, q6h  enoxaparin, 40 mg, subcutaneous, q24h  gabapentin, 300 mg, oral, q8h RICHARD  simvastatin, 20 mg, oral, Nightly      Continuous medications  dextrose 5%-0.45 % sodium chloride, 10 mL/hr, Last Rate: 10 mL/hr (11/15/23 0830)  ropivacaine (PF) in NS cmpd, 6 mL/hr      PRN medications  PRN medications: ondansetron ODT **OR** ondansetron, oxyCODONE, oxyCODONE     Physical Exam:  Constitutional:  no distress, alert and cooperative  Eyes: clear sclera  Head/Neck: No apparent injury, trachea midline  Respiratory/Thorax: Patent airways, thorax symmetric, breathing comfortably  Cardiovascular: no pitting edema  Gastrointestinal: Nondistended  Musculoskeletal: ROM intact  Extremities: no clubbing  Neurological: alert, garcia x4  Psychological: Appropriate affect    Results for orders placed or performed during the hospital encounter of 11/13/23 (from the past 24 hour(s))   CBC   Result Value Ref Range    WBC 11.4 (H) 4.4 - 11.3 x10*3/uL    nRBC 0.0 0.0 - 0.0 /100 WBCs    RBC 4.06 4.00 - 5.20 x10*6/uL    Hemoglobin 12.2 12.0 - 16.0 g/dL    Hematocrit 38.1 36.0 - 46.0 %    MCV 94 80 - 100 fL    MCH 30.0 26.0 - 34.0 pg    MCHC 32.0 32.0 - 36.0 g/dL    RDW 13.4 11.5 - 14.5 %    Platelets 304 150 - 450 x10*3/uL       Plan:     - BL QL blocks performed preoperatively on 11/13/23  - Ambit ball with Ropivacaine 0.2%/NaCl 0.9% 500mL, Rate 7 cc/hr bilaterally  - Ambit medication will not interfere with pain medication prescribed by the primary team.   - Please be aware of local anesthetic toxic dose and absorption variability before considering lidocaine patches  - Catheters DC today at bedside (tips intact)  - Rest of pain  management per primary team  -Acute pain team will sign off at this time     Acute Pain Resident  pg [phone]  [phone]

## 2023-11-15 NOTE — CARE PLAN
The clinical goals for the shift include VSS, pain mgmt, advance/tolerate diet, monitor I&O's      Problem: Pain  Goal: My pain/discomfort is manageable  Outcome: Progressing     Problem: Safety  Goal: Patient will be injury free during hospitalization  Outcome: Progressing  Goal: I will remain free of falls  Outcome: Progressing     Problem: Daily Care  Goal: Daily care needs are met  Outcome: Progressing     Problem: Psychosocial Needs  Goal: Demonstrates ability to cope with hospitalization/illness  Outcome: Progressing  Goal: Collaborate with me, my family, and caregiver to identify my specific goals  Outcome: Progressing     Problem: Discharge Barriers  Goal: My discharge needs are met  Outcome: Progressing

## 2023-11-15 NOTE — NURSING NOTE
1910 PT transferred into [place] [id number] in stable condition. Pt and family oriented to unit, room, call light.

## 2023-11-16 ENCOUNTER — PHARMACY VISIT (OUTPATIENT)
Dept: PHARMACY | Facility: CLINIC | Age: 63
End: 2023-11-16
Payer: MEDICARE

## 2023-11-16 VITALS
SYSTOLIC BLOOD PRESSURE: 115 MMHG | HEART RATE: 73 BPM | WEIGHT: 139 LBS | RESPIRATION RATE: 16 BRPM | HEIGHT: 67 IN | OXYGEN SATURATION: 97 % | BODY MASS INDEX: 21.82 KG/M2 | DIASTOLIC BLOOD PRESSURE: 74 MMHG | TEMPERATURE: 96.3 F

## 2023-11-16 PROCEDURE — 2500000001 HC RX 250 WO HCPCS SELF ADMINISTERED DRUGS (ALT 637 FOR MEDICARE OP): Performed by: STUDENT IN AN ORGANIZED HEALTH CARE EDUCATION/TRAINING PROGRAM

## 2023-11-16 PROCEDURE — 0DBB0ZZ EXCISION OF ILEUM, OPEN APPROACH: ICD-10-PCS

## 2023-11-16 PROCEDURE — 0DTF0ZZ RESECTION OF RIGHT LARGE INTESTINE, OPEN APPROACH: ICD-10-PCS

## 2023-11-16 PROCEDURE — RXMED WILLOW AMBULATORY MEDICATION CHARGE

## 2023-11-16 RX ORDER — PETROLATUM 420 MG/G
OINTMENT TOPICAL
Status: DISCONTINUED
Start: 2023-11-16 | End: 2023-11-16 | Stop reason: HOSPADM

## 2023-11-16 RX ADMIN — GABAPENTIN 300 MG: 300 CAPSULE ORAL at 05:32

## 2023-11-16 RX ADMIN — ACETAMINOPHEN 975 MG: 325 TABLET ORAL at 00:55

## 2023-11-16 RX ADMIN — ACETAMINOPHEN 975 MG: 325 TABLET ORAL at 05:32

## 2023-11-16 RX ADMIN — OXYCODONE HYDROCHLORIDE 5 MG: 5 TABLET ORAL at 07:58

## 2023-11-16 ASSESSMENT — COGNITIVE AND FUNCTIONAL STATUS - GENERAL
MOVING FROM LYING ON BACK TO SITTING ON SIDE OF FLAT BED WITH BEDRAILS: A LITTLE
CLIMB 3 TO 5 STEPS WITH RAILING: A LITTLE
TOILETING: A LITTLE
STANDING UP FROM CHAIR USING ARMS: A LITTLE
MOBILITY SCORE: 18
TURNING FROM BACK TO SIDE WHILE IN FLAT BAD: A LITTLE
HELP NEEDED FOR BATHING: A LITTLE
WALKING IN HOSPITAL ROOM: A LITTLE
DAILY ACTIVITIY SCORE: 22
MOVING TO AND FROM BED TO CHAIR: A LITTLE

## 2023-11-16 ASSESSMENT — PAIN SCALES - GENERAL
PAINLEVEL_OUTOF10: 7
PAINLEVEL_OUTOF10: 5 - MODERATE PAIN
PAINLEVEL_OUTOF10: 0 - NO PAIN

## 2023-11-16 ASSESSMENT — PAIN - FUNCTIONAL ASSESSMENT
PAIN_FUNCTIONAL_ASSESSMENT: 0-10

## 2023-11-16 ASSESSMENT — PAIN DESCRIPTION - DESCRIPTORS: DESCRIPTORS: ACHING

## 2023-11-16 NOTE — DISCHARGE SUMMARY
Discharge Diagnosis  Malignant neoplasm of right colon (CMS/HCC)    Issues Requiring Follow-Up  Cecal mass pathology     Test Results Pending At Discharge  Pending Labs       Order Current Status    Surgical Pathology Exam In process            Hospital Course   Pt is a 64yo F with hx of cecal mass with fistula to R groin who underwent an open Right hemicolectomy primary anastomosis. She was discharged on POD 3 with bowel function, tolerating a diet, and ambulating. Pain was controlled with PO meds. She will be discharged on lovenox because of the presumed cancer diagnosis.     Pertinent Physical Exam At Time of Discharge  Physical Exam    Constitutional- no acute distress  Cards- regular rate   Resp- nonlabored breathing on room air   Abdomen- soft, not tender, not distended; incision with staples clean/dry intact; R groin incision clean/dry/intact; no R groin swelling  Extremities- BARRAZA   Skin- warm, dry   Neuro- alert and oriented x3   Psych- appropriate mood   Tubes/lines- none     Home Medications     Medication List      START taking these medications     enoxaparin 40 mg/0.4 mL syringe; Commonly known as: Lovenox; Inject 0.4   mL (40 mg) under the skin once every 24 hours for 25 days.   oxyCODONE 5 mg immediate release tablet; Commonly known as: Roxicodone;   Take 1 tablet (5 mg) by mouth every 6 hours if needed for moderate pain (4   - 6) for up to 7 days.     CONTINUE taking these medications     acetaminophen 500 mg capsule; Commonly known as: Tylenol; Take 500 mg by   mouth every 6 hours starting two days prior to surgery. Take 1000 mg by   mouth the morning of surgery, at least 1 hour prior to arrival time.   atorvastatin 20 mg tablet; Commonly known as: Lipitor     STOP taking these medications     bisacodyl 5 mg EC tablet; Commonly known as: Dulcolax (bisacodyl)   chlorhexidine 0.12 % solution; Commonly known as: Peridex   chlorhexidine 4 % external liquid; Commonly known as: Hibiclens   gabapentin 100  mg capsule; Commonly known as: Neurontin   methylPREDNISolone 4 mg tablets; Commonly known as: Medrol Dospak   metroNIDAZOLE 500 mg tablet; Commonly known as: Flagyl       Outpatient Follow-Up  Future Appointments   Date Time Provider Department Center   12/12/2023 11:00 AM Ame Kelly, PRABHJOT-CNP [id number]       Ruben Sarabia MD

## 2023-11-16 NOTE — PROGRESS NOTES
Physical Therapy                 Therapy Communication Note    Patient Name: Martine Cornejo  MRN: [id number]  Today's Date: 11/16/2023     Discipline: Physical Therapy    Missed Visit Reason: Missed Visit Reason:  (Per RN patient is up ad carolyn and ambulating without any PT needs. Also states patient is discharging this date. DC PT no further in need of skilled PT services.)    Missed Time: Attempt

## 2023-11-16 NOTE — CARE PLAN
Provided care for patient 2330 - 0700  Patient midline surgery site approximated with staples. Right groin surgery site glue in place, edges approximated. Pain well controlled with schedule tylenol and gabapentin. Unmeasured urine output overnight x2. No bowel movement overnight.

## 2023-11-16 NOTE — DISCHARGE INSTRUCTIONS
Discharge Instructions    BRIEF OVERVIEW  Admitting Provider: Sven Osuna MD  Discharge Provider: Sven Osuna MD  Primary Care Physician at Discharge: Jerome Velasquez [phone]     Admission Date: 11/13/2023     Discharge Date: 11/16/2023    Reason for Admission  Admitted for postoperative care    Hospital Course  You were admitted to the hospital for open Right hemicolectomy and groin exploration on 11/13.  You recovered from surgery well.  Once your pain was controlled, you were ambulating, tolerating a diet and having bowel function, you were deemed stable for discharge on postoperative day 3.    Call your doctor if you have any of the following:  A fever of 100.5 °F (38.0 °C) or higher  Pain, bloating, cramping, or tenderness in your abdomen  Nausea or vomiting  Trouble passing gas  Trouble having a bowel movement  Trouble urinating (peeing)  Swelling around your wound  Pain on your wound that doesn't go away with medication  Bleeding from your rectum  Any of the following signs of dehydration (not having enough liquids):   Feeling very thirsty  Dry mouth or skin  Fatigue (feeling more tired or weak than usual)  Loss of appetite  Feeling dizzy when you stand  Headache  Leg cramps  Any of the following signs of wound infection:   Swelling  Increased pain  Warmth at the wound site  Drainage that looks like pus (thick and milky)    Diet  Eat soft foods that you can cut with a fork.  Avoid leafy greens and fruits and vegetables with fibrous skins.    Medications  - You should take tylenol 650mg every 6 hours for pain. As long as it's not contraindicated, you can also take 400mg ibuprofen every 6 hours.  Taking them in an alternating fashion (tylenol three hours after ibuprofen) helps both medication work better.  - You may have been prescribed narcotic pain medications to be taken as needed for severe pain.   - You should take a stool softener to help you have at least one soft bowel movement daily.    - You may resume your previous medications unless otherwise instructed.     Bowel function  Bowel function will be irregular at first. You may experience some loose stool alternating with constipation.  This is normal. As your diet advances and you begin to eat more regularly, your stool pattern will also become more regular.    Activity  No heavy lifting > 10 lbs for 4 weeks or until approved by your doctor. and Do not drive while taking narcotics.    Wound Care  Your incisions are covered with skin glue.  This will fall off on its own.  It can get wet., You may shower but do not submerge the incisions., and Your incision is closed with staples. These will be removed around two weeks after surgery. No creams/ointments/gels over incision; keep dry; let water run over incision during shower but don't submerge in water.    Test Results Pending at Discharge  Pending Labs       Order Current Status    Surgical Pathology Exam In process            Outpatient Follow-Up  Future Appointments   Date Time Provider Department Center   12/12/2023 11:00 AM PRABHJOT Verdugo-CNP [id number]             Contact  If you have any questions or to schedule an appointment, please call Dr. Osuna's office at [phone]

## 2023-11-16 NOTE — NURSING NOTE
Educated patient on discharge instructions including wound care, medications, when to call your doctor, activity restrictions. Educated on lovenox administration. Ensured medications were delivered prior to DC. Ensured all questions were answered. Patient acknowledged an understanding of information. Discontinued IV's.

## 2023-11-17 NOTE — OP NOTE
laparatomy, right colectomy en bloc abdominal wall resection. Operative Note     Date: 2023  OR Location: [facility]    Name: Martine Cornejo : 1960, Age: 63 y.o., MRN: [id number], Sex: female    Diagnosis  Pre-op Diagnosis     * Malignant neoplasm of ascending colon (CMS/HCC) [C18.2]     * Colon cancer, ascending (CMS/HCC) [C18.2] Post-op Diagnosis     * Malignant neoplasm of ascending colon (CMS/HCC) [C18.2]     * Colon cancer, ascending (CMS/HCC) [C18.2]     Procedures  Lapartomy, right colectomy with enbloc resection of abdominal wall and right inguinal canal, ileocolic anastomosis    Surgeons      * Sven Osuna - Primary    Resident/Fellow/Other Assistant:  Surgeon(s) and Role:    Procedure Summary  Anesthesia: General  ASA: II  Anesthesia Staff: Anesthesiologist: Tommy Almonte MD  CRNA: PRABHJOT Alcantara-ANEUDY  C-AA: JHONATAN Murdock  SRNA: CHAUNCEY Mcdermott  Estimated Blood Loss: 20mL  Intra-op Medications:   Medication Name Total Dose   heparin (porcine) injection 5,000 Units 5,000 Units   metroNIDAZOLE in NaCl (iso-os) (Flagyl)  mg 500 mg              Anesthesia Record               Intraprocedure I/O Totals          Intake    Propofol Drip 0.00 mL    The total shown is the total volume documented since Anesthesia Start was filed.    ciprofloxacin (Cipro) IVPB 400 mg 200 /mL D5W (premix) 200.00 mL    Total Intake 200 mL       Output    Urine 250 mL    Total Output 250 mL       Net    Net Volume -50 mL          Specimen:   ID Type Source Tests Collected by Time   1 : INGUINAL CANAL Tissue SOFT TISSUE RESECTION SURGICAL PATHOLOGY EXAM Sven Osuna MD 2023 1614   2 : RIGHT COLON Tissue COLON - RIGHT HEMICOLECTOMY SURGICAL PATHOLOGY EXAM Sven Osuna MD 2023 1623   3 : TERMINAL ILEUM Tissue TERMINAL ILEUM WITH RIGHT HEMICOLECTOMY SURGICAL PATHOLOGY EXAM Sven Osuna MD 2023 1627        Staff:   Circulator: Kenzie Collins RN; Tam Mendenhall RN  Relief  Circulator: Carley Farias, FELY; Ysabel Fisher, RN; David Montemayor, RN  Relief Scrub: Galen Peterson  Scrub Person: Viviana Condetiz; Mary Alice Vincent         Drains and/or Catheters:   [REMOVED] Urethral Catheter Non-latex 16 Fr. (Removed)       Tourniquet Times:         Implants:     Findings: cecal tumor adherent to RLQ abdominal wall with appendix scarred into right inguinal canal    Indications: Martine Cornejo is an 63 y.o. female who is having surgery for Malignant neoplasm of ascending colon (CMS/HCC) [C18.2]  Colon cancer, ascending (CMS/HCC) [C18.2]. Patient presented with right groin abscess and was found to have cecal cancer obstructing appendix with rupture of appendix into right inguinal canal with resulting abscess. Acute infection/abscess now resolved. Patient brought to OR for right colectomy. Staging CT with no evidence of metastatic disease.    The patient was seen in the preoperative area. The risks, benefits, complications, treatment options, non-operative alternatives, expected recovery and outcomes were discussed with the patient. The possibilities of reaction to medication, pulmonary aspiration, injury to surrounding structures, bleeding, recurrent infection, the need for additional procedures, failure to diagnose a condition, and creating a complication requiring transfusion or operation were discussed with the patient. The patient concurred with the proposed plan, giving informed consent.  The site of surgery was properly noted/marked if necessary per policy. The patient has been actively warmed in preoperative area. Preoperative antibiotics have been ordered and given within 1 hours of incision. Venous thrombosis prophylaxis have been ordered including bilateral sequential compression devices and chemical prophylaxis    Procedure Details: Patient was brought to the operating room and placed on the operating table in the supine position.  After induction of general endotracheal anesthesia  the skin the abdomen groins and upper legs was prepped and draped in the usual sterile fashion.  Surgical timeout was performed.  The peritoneal cavity was entered through a midline incision and the abdomen was explored.  There were 3 palpable lesions on the surface of the liver.  Each of these measured approximately 5 mm in diameter.  One of the lesions was subcapsular and was able to be visualized and was a simple cyst.  The other lesions were not able to be well visualized.  Within the right lower quadrant the cecum was densely adherent to the underside of the abdominal wall and the lateral aspect of the inguinal canal.  There was a palpable tumor at the base of the cecum which was adherent to the abdominal wall.  The appendix was residing within the inguinal canal and was scarred in place due to the previous perforation and abscess.  Lateral attachments of the ascending colon were mobilized using electrocautery.  Care was taken to identify and preserve the right ureter.  The hepatic flexure was taken down with cautery as well avoiding injury to the duodenum.  The omentum was  from the proximal half of the transverse colon and its mesocolon by dissecting in the avascular plane.  With the right colon completely mobilized to the midline the ileocolic vessels were divided and ligated at their origins.  Dissection was then carried through the mesentery until the right branch of the middle colic artery and vein were identified.  These were divided and ligated.  Dissection was then carried through the mid transverse mesocolon to the border of the transverse colon itself.  The transverse colon was then divided with a firing of the THEA 75 mm stapler.  Dissection was then carried through the mesentery of the ileum to a point approximately 20 cm proximal to the ileocecal valve.  The ileum was divided at this point with the THEA 75 mm stapler.  The ileum distal to this was adherent to the cecal tumor and the  abdominal wall at the point of fixation.  With the right colon now fully mobilized and its vasculature divided.  Attention was turned to the point of fixation on the abdominal wall.  An en bloc abdominal wall resection was then performed staying lateral to the area of tumor fixation.  We carried this dissection into the inguinal canal dissecting around the adherent appendix and were able to cleanly mobilized the appendix from the neurovascular structures.  The specimen was then removed from the operative field and was sent to pathology.  Intestinal continuity was restored by creating a side-to-side functional end-to-end ileocolic anastomosis.  Attention was then turned to the groin where the previous drainage site of the right groin abscess was then excised carrying the dissection circumferentially into the inguinal canal removing scar and soft tissue.  This resected specimen was sent to pathology for frozen section examination.  It returned as showing lymphoid tissue with no evidence of invasive cancer.  Attention was then turned back to the abdomen.  The peritoneal cavity was irrigated with saline solution and was aspirated dry.  Inspection was made for hemostasis and this was found to be adequate.  All sponge and instrument counts were confirmed as correct.  Fascia of the midline incision was closed with a running #1 looped PDS suture.  The wound was irrigated and the skin was closed with staples.  Sterile dressings were applied.  Deep tissues in the inguinal canal from the right groin approach were then reapproximated with interrupted 2-0 Vicryl sutures and then the skin was closed with a running 4-0 Monocryl suture.  Sterile dressings was applied.  Patient tolerated the above procedure and is taken to the recovery room in stable condition.  Complications:  None; patient tolerated the procedure well.    Disposition: PACU - hemodynamically stable.  Condition: stable     Colon Resection  Operation performed with  curative intent Yes   Tumor Location Cecum   Extent of colon and vascular resection Right hemicolectomy - ileocolic, right colic (if present)          Additional Details: none    Attending Attestation: I was present and scrubbed for the entire procedure.    Sven Osuna  Phone Number: [phone]

## 2023-11-28 LAB
LAB AP ASR DISCLAIMER: NORMAL
LABORATORY COMMENT REPORT: NORMAL
Lab: NORMAL
PATH REPORT.ADDENDUM SPEC: NORMAL
PATH REPORT.FINAL DX SPEC: NORMAL
PATH REPORT.GROSS SPEC: NORMAL
PATH REPORT.RELEVANT HX SPEC: NORMAL
PATH REPORT.TOTAL CANCER: NORMAL
PATHOLOGY SYNOPTIC REPORT: NORMAL
RESIDENT REVIEW: NORMAL

## 2023-12-01 ENCOUNTER — OFFICE VISIT (OUTPATIENT)
Dept: SURGERY | Facility: CLINIC | Age: 63
End: 2023-12-01
Payer: COMMERCIAL

## 2023-12-01 VITALS
HEIGHT: 67 IN | HEART RATE: 71 BPM | SYSTOLIC BLOOD PRESSURE: 108 MMHG | BODY MASS INDEX: 21.19 KG/M2 | DIASTOLIC BLOOD PRESSURE: 73 MMHG | WEIGHT: 135 LBS | TEMPERATURE: 97 F

## 2023-12-01 DIAGNOSIS — C18.2 MALIGNANT NEOPLASM OF RIGHT COLON (MULTI): Primary | ICD-10-CM

## 2023-12-01 PROCEDURE — 1036F TOBACCO NON-USER: CPT | Performed by: NURSE PRACTITIONER

## 2023-12-01 PROCEDURE — 99024 POSTOP FOLLOW-UP VISIT: CPT | Performed by: NURSE PRACTITIONER

## 2023-12-01 ASSESSMENT — ENCOUNTER SYMPTOMS
SHORTNESS OF BREATH: 0
ROS GI COMMENTS: AS NOTED IN HPI
LIGHT-HEADEDNESS: 0
PALPITATIONS: 0
BRUISES/BLEEDS EASILY: 0
CHEST TIGHTNESS: 0
DYSURIA: 0
APNEA: 0
HEADACHES: 0
DIZZINESS: 0
COUGH: 0
CHILLS: 0
ADENOPATHY: 0
FEVER: 0

## 2023-12-01 NOTE — PROGRESS NOTES
Chief Compliant:  POV    History Of Present Illness  Martine Cornejo is a 63 y.o. female with h/o ascending colon cancer. S/p laparotomy, right colectomy en bloc, abdominal wall resection on 11/13/23.  Path:  FINAL DIAGNOSIS   A. SOFT TISSUE RESECTION: PORTIONS OF LYMPHOID TISSUE AND SOFT TISSUE WITH NO SIGNIFICANT PATHOLOGIC FINDINGS.     B. COLON - RIGHT HEMICOLECTOMY: INVASIVE LOW-GRADE ADENOCARCINOMA, SEE SYNOPTIC REPORT AND NOTE.     Note: Immunohistochemistry for mismatch repair proteins has been ordered and this report will be issued in the form of an addendum.     A portion of skin with no significant pathologic finding is noted in the specimen.b     NO EVIDENCE OF NEOPLASIA IDENTIFIED IN 23 SUBMITTED LYMPH NODES.     C. TERMINAL ILEUM WITH RIGHT HEMICOLECTOMY: PORTION OF SMALL INTESTINE WITH NO SIGNIFICANT PATHOLOGIC FINDINGS.       Denies any F/C, N/V, CP/SOB, dysuria.   Appetite: good  Energy: good  Weight: down about 8 lbs  BM: 2-3 times daily      Review of Systems   Constitutional:  Negative for chills and fever.        Change in weight down 8 lbs   Respiratory:  Negative for apnea, cough, chest tightness and shortness of breath.    Cardiovascular:  Negative for chest pain, palpitations and leg swelling.   Gastrointestinal:         As noted in HPI   Genitourinary:  Negative for dysuria.   Neurological:  Negative for dizziness, light-headedness and headaches.   Hematological:  Negative for adenopathy. Does not bruise/bleed easily.   Psychiatric/Behavioral:  Negative for suicidal ideas.         Physical Exam  Constitutional:       Appearance: Normal appearance.   HENT:      Head: Normocephalic.   Neck:      Vascular: No carotid bruit.   Cardiovascular:      Rate and Rhythm: Normal rate and regular rhythm.      Pulses: Normal pulses.      Heart sounds: Normal heart sounds.   Pulmonary:      Effort: Pulmonary effort is normal.      Breath sounds: Normal breath sounds.   Abdominal:      Comments: Soft, NT, ND.  "Incision is clean and dry.   Musculoskeletal:      Cervical back: No tenderness.   Lymphadenopathy:      Cervical: No cervical adenopathy.   Neurological:      General: No focal deficit present.      Mental Status: She is alert and oriented to person, place, and time.   Psychiatric:         Mood and Affect: Mood normal.         Behavior: Behavior normal.       Last Recorded Vitals  Blood pressure 108/73, pulse 71, temperature 36.1 °C (97 °F), height 1.702 m (5' 7\"), weight 61.2 kg (135 lb).    Assessment:  63F with h/o ascending colon cancer  S/p right colectomy en bloc, abdominal wall resection on 11/13/23 for T3N0 cancer  Doing well post-op    Plan:  -Diet: OK to slowly advance diet in 2 weeks  -Activity: Normal activities can be resumed. No lifting greater than 10 lbs for a full 6 weeks following surgery   -Referral to medical oncology for a discussion.  -Follow-up in 3 weeks or sooner if any problems.  -All questions and concerns were answered. Encouraged to call with any question or concerns.        Ame Kelly, APRN-CNP    "

## 2023-12-06 ENCOUNTER — TUMOR BOARD CONFERENCE (OUTPATIENT)
Dept: HEMATOLOGY/ONCOLOGY | Facility: HOSPITAL | Age: 63
End: 2023-12-06
Payer: COMMERCIAL

## 2023-12-11 ENCOUNTER — LAB (OUTPATIENT)
Dept: LAB | Facility: HOSPITAL | Age: 63
End: 2023-12-11
Payer: COMMERCIAL

## 2023-12-11 ENCOUNTER — OFFICE VISIT (OUTPATIENT)
Dept: HEMATOLOGY/ONCOLOGY | Facility: HOSPITAL | Age: 63
End: 2023-12-11
Payer: COMMERCIAL

## 2023-12-11 VITALS
DIASTOLIC BLOOD PRESSURE: 69 MMHG | OXYGEN SATURATION: 100 % | BODY MASS INDEX: 21.02 KG/M2 | SYSTOLIC BLOOD PRESSURE: 105 MMHG | HEART RATE: 70 BPM | HEIGHT: 67 IN | TEMPERATURE: 99 F | RESPIRATION RATE: 16 BRPM | WEIGHT: 133.9 LBS

## 2023-12-11 DIAGNOSIS — C18.2 MALIGNANT NEOPLASM OF RIGHT COLON (MULTI): ICD-10-CM

## 2023-12-11 LAB
ALBUMIN SERPL BCP-MCNC: 4.1 G/DL (ref 3.4–5)
ALP SERPL-CCNC: 90 U/L (ref 33–136)
ALT SERPL W P-5'-P-CCNC: 19 U/L (ref 7–45)
ANION GAP SERPL CALC-SCNC: 11 MMOL/L (ref 10–20)
AST SERPL W P-5'-P-CCNC: 20 U/L (ref 9–39)
BASOPHILS # BLD AUTO: 0.07 X10*3/UL (ref 0–0.1)
BASOPHILS NFR BLD AUTO: 1 %
BILIRUB SERPL-MCNC: 0.5 MG/DL (ref 0–1.2)
BUN SERPL-MCNC: 13 MG/DL (ref 6–23)
CALCIUM SERPL-MCNC: 10.5 MG/DL (ref 8.6–10.6)
CEA SERPL-MCNC: 1.6 UG/L
CHLORIDE SERPL-SCNC: 103 MMOL/L (ref 98–107)
CO2 SERPL-SCNC: 31 MMOL/L (ref 21–32)
CREAT SERPL-MCNC: 0.77 MG/DL (ref 0.5–1.05)
EOSINOPHIL # BLD AUTO: 0.34 X10*3/UL (ref 0–0.7)
EOSINOPHIL NFR BLD AUTO: 5 %
ERYTHROCYTE [DISTWIDTH] IN BLOOD BY AUTOMATED COUNT: 12.8 % (ref 11.5–14.5)
GFR SERPL CREATININE-BSD FRML MDRD: 86 ML/MIN/1.73M*2
GLUCOSE SERPL-MCNC: 84 MG/DL (ref 74–99)
HCT VFR BLD AUTO: 38.4 % (ref 36–46)
HGB BLD-MCNC: 12.5 G/DL (ref 12–16)
IMM GRANULOCYTES # BLD AUTO: 0.01 X10*3/UL (ref 0–0.7)
IMM GRANULOCYTES NFR BLD AUTO: 0.1 % (ref 0–0.9)
LYMPHOCYTES # BLD AUTO: 2.41 X10*3/UL (ref 1.2–4.8)
LYMPHOCYTES NFR BLD AUTO: 35.3 %
MCH RBC QN AUTO: 30.2 PG (ref 26–34)
MCHC RBC AUTO-ENTMCNC: 32.6 G/DL (ref 32–36)
MCV RBC AUTO: 93 FL (ref 80–100)
MONOCYTES # BLD AUTO: 0.58 X10*3/UL (ref 0.1–1)
MONOCYTES NFR BLD AUTO: 8.5 %
NEUTROPHILS # BLD AUTO: 3.42 X10*3/UL (ref 1.2–7.7)
NEUTROPHILS NFR BLD AUTO: 50.1 %
NRBC BLD-RTO: 0 /100 WBCS (ref 0–0)
PLATELET # BLD AUTO: 378 X10*3/UL (ref 150–450)
POTASSIUM SERPL-SCNC: 3.7 MMOL/L (ref 3.5–5.3)
PROT SERPL-MCNC: 7.4 G/DL (ref 6.4–8.2)
RBC # BLD AUTO: 4.14 X10*6/UL (ref 4–5.2)
SODIUM SERPL-SCNC: 141 MMOL/L (ref 136–145)
WBC # BLD AUTO: 6.8 X10*3/UL (ref 4.4–11.3)

## 2023-12-11 PROCEDURE — 99204 OFFICE O/P NEW MOD 45 MIN: CPT | Performed by: STUDENT IN AN ORGANIZED HEALTH CARE EDUCATION/TRAINING PROGRAM

## 2023-12-11 PROCEDURE — 85025 COMPLETE CBC W/AUTO DIFF WBC: CPT

## 2023-12-11 PROCEDURE — 36415 COLL VENOUS BLD VENIPUNCTURE: CPT

## 2023-12-11 PROCEDURE — 80053 COMPREHEN METABOLIC PANEL: CPT

## 2023-12-11 PROCEDURE — 82378 CARCINOEMBRYONIC ANTIGEN: CPT

## 2023-12-11 PROCEDURE — 99214 OFFICE O/P EST MOD 30 MIN: CPT | Performed by: STUDENT IN AN ORGANIZED HEALTH CARE EDUCATION/TRAINING PROGRAM

## 2023-12-11 PROCEDURE — 1036F TOBACCO NON-USER: CPT | Performed by: STUDENT IN AN ORGANIZED HEALTH CARE EDUCATION/TRAINING PROGRAM

## 2023-12-11 ASSESSMENT — ENCOUNTER SYMPTOMS
OCCASIONAL FEELINGS OF UNSTEADINESS: 0
DEPRESSION: 0
LOSS OF SENSATION IN FEET: 0

## 2023-12-11 ASSESSMENT — PATIENT HEALTH QUESTIONNAIRE - PHQ9
SUM OF ALL RESPONSES TO PHQ9 QUESTIONS 1 AND 2: 0
2. FEELING DOWN, DEPRESSED OR HOPELESS: NOT AT ALL
1. LITTLE INTEREST OR PLEASURE IN DOING THINGS: NOT AT ALL

## 2023-12-11 ASSESSMENT — PAIN SCALES - GENERAL: PAINLEVEL: 2

## 2023-12-11 ASSESSMENT — COLUMBIA-SUICIDE SEVERITY RATING SCALE - C-SSRS
6. HAVE YOU EVER DONE ANYTHING, STARTED TO DO ANYTHING, OR PREPARED TO DO ANYTHING TO END YOUR LIFE?: NO
2. HAVE YOU ACTUALLY HAD ANY THOUGHTS OF KILLING YOURSELF?: NO
1. IN THE PAST MONTH, HAVE YOU WISHED YOU WERE DEAD OR WISHED YOU COULD GO TO SLEEP AND NOT WAKE UP?: NO

## 2023-12-12 ENCOUNTER — APPOINTMENT (OUTPATIENT)
Dept: SURGERY | Facility: CLINIC | Age: 63
End: 2023-12-12
Payer: COMMERCIAL

## 2023-12-15 NOTE — PROGRESS NOTES
Patient ID: Martine Cornejo is a 63 y.o. female.  Referring Physician: Ame Kelly, APRN-CNP  [address]  Department [place] [address]  Primary Care Provider: Jerome Velasquez DO  Surgeon: Sven Osuna  Visit Type: Initial Visit    Cancer History:  DIAGNOSIS: Cecal cancer    STAGING: pT3 pN0 Mx    CURRENT SITES OF DISEASE: None    MOLECULAR/GENOMIC:  MMR-intact    TUMOR MARKER:   CEA 11/2023 1.8    CURRENT TREATMENT:       PRIOR TREATMENT:   11/13/23: S/p R colectomy with en bloc abdominal wall resection    HISTORY:   6/2023: Presented with R groin mass. Found to have abscess in the groin. Ultrasound should complex solid & cystic mass with channel draining to the surface, recommend CT.  7/28/23: CT showed suspicious masslike thickening of the cecum and appendix for malignancy, which extends into the R inguinal canal. Nonspecific inguinal and R lower quadrant mesenteric lymphadenopathy and/or adjuacent soft tissue metastatic deposits. Nonspecific 8mm hepatic hypodensity.   9/11/23: C-scope showed ulcerated nearly complete obstructing large mass in the proximal ascending colon & cecum, mass was circumferential. 5mm polyp in sigmoid colon. Pathology of mass showed invasive moderately differentiated adenocarcinoma. Polyp was hyperplastic.  9/27/23: PET CT showed FDG avid cecal wall thickening possibly extending to the parietal peritoneum, additional nonspecific hypermetabolic focus along the cecal wall superolateral to the mass, may be physiologic with an additional site of neoplasm not excluded.  11/13/23: Underwent R colectomy with en bloc abdominal wall resection. Intra-op cecum was densely adherent to the underside of the abdominal wall and lateral aspect of the inguinal canal, there was a palpable tumor at base of the cecum. Appendix was residing within the inguinal canal and scarred inplace due to previous perforation and abscess. Final pathology showed invasive low-grade  "adenocarcinoma, grade 2 moderately differentiated, no macroscopic tumor perforation, no LVI, no PNI, negative margins 0/23 LNs.     PMH: HLD    SH: , no tobacco, illicits, EtOH    FH: No FH of malignancy    Subjective:   Feeling very well, she is healing well from the surgery. Still with some firmness under the skin where her abscess had been, no pain.     No fevers, chills, chest pain, shortness of breath, abdominal pain, nausea, vomiting, diarrhea, or rashes.    ROS as above. Remainder of 10-point review of systems elicited and negative.    Objective:  /69 (BP Location: Left arm, Patient Position: Sitting, BP Cuff Size: Adult)   Pulse 70   Temp 37.2 °C (99 °F)   Resp 16   Ht (S) 1.699 m (5' 6.89\")   Wt 60.7 kg (133 lb 14.4 oz)   SpO2 100%   BMI 21.04 kg/m²     PE:  Gen: A&O, NAD  Head: Normocephalic, atraumatic  Eyes: no scleral icterus  ENT: mucous membranes moist, no oropharyngeal lesions  Resp: Lungs CTAB  Cardiac: Normal rate, regular rhythm, no murmurs appreciated  Abdomen: Soft, nondistended, nontender, +BS. Incisions c/d/I, healing well  Neuro: CNII-XII grossly intact  Psych: appropriate mood & affect  Skin: warm, dry, no apparent rashes     ECOG Performance Status: 0      Assessment & Plan:   Martine Cornejo is a 63 y.o. female with Stage IIA cecal cancer that presented 6/2023 after obstructing the appendix causing appendiceal abscess tracking to the R groin.      Colonoscopy 9/11/23 led to the diagnosis of cecal cancer, and she underwent surgical resection on 11/13/23 with Dr. Osuna.     Final pathology showed pT3 pN0 cecal cancer with no high risk features. I discussed with her that while the cecal mass itself was obstructing the appendix leading to intra-abdominal abscess tracking to the groin, this is the only significant high risk feature. I discussed the option of adjuvant chemotherapy and general risks and benefits, and she is motivated for close surveillance over adjuvant therapy. I " am fully supportive of this approach.    Plan:   Stage IIA Cecal Cancer  - Signatera, CEA, CBC, CMP q3mo for first 2 years then can space less frequently  - CT in 6mo  - C-scope 1 year from surgery: due 11/2024  - RTC 3mo with labs

## 2023-12-22 ENCOUNTER — OFFICE VISIT (OUTPATIENT)
Dept: SURGERY | Facility: CLINIC | Age: 63
End: 2023-12-22
Payer: COMMERCIAL

## 2023-12-22 VITALS
TEMPERATURE: 97.4 F | HEART RATE: 52 BPM | DIASTOLIC BLOOD PRESSURE: 61 MMHG | BODY MASS INDEX: 21.5 KG/M2 | HEIGHT: 67 IN | SYSTOLIC BLOOD PRESSURE: 105 MMHG | WEIGHT: 137 LBS

## 2023-12-22 DIAGNOSIS — C18.9 ADENOCARCINOMA OF COLON (MULTI): Primary | ICD-10-CM

## 2023-12-22 PROCEDURE — 99024 POSTOP FOLLOW-UP VISIT: CPT | Performed by: NURSE PRACTITIONER

## 2023-12-22 PROCEDURE — 1036F TOBACCO NON-USER: CPT | Performed by: NURSE PRACTITIONER

## 2023-12-22 ASSESSMENT — ENCOUNTER SYMPTOMS
ROS GI COMMENTS: AS NOTED IN HPI
SHORTNESS OF BREATH: 0
APNEA: 0
CHILLS: 0
DIZZINESS: 0
BRUISES/BLEEDS EASILY: 0
CHEST TIGHTNESS: 0
PALPITATIONS: 0
DYSURIA: 0
LIGHT-HEADEDNESS: 0
FEVER: 0
COUGH: 0
ADENOPATHY: 0
HEADACHES: 0

## 2023-12-22 NOTE — PROGRESS NOTES
Chief Compliant:  POV    History Of Present Illness  Martine Cornejo is a 63 y.o. female with h/o ascending colon cancer. S/p laparotomy, right colectomy en bloc, abdominal wall resection on 11/13/23.  Path:  FINAL DIAGNOSIS   A. SOFT TISSUE RESECTION: PORTIONS OF LYMPHOID TISSUE AND SOFT TISSUE WITH NO SIGNIFICANT PATHOLOGIC FINDINGS.     B. COLON - RIGHT HEMICOLECTOMY: INVASIVE LOW-GRADE ADENOCARCINOMA, SEE SYNOPTIC REPORT AND NOTE.     Note: Immunohistochemistry for mismatch repair proteins has been ordered and this report will be issued in the form of an addendum.     A portion of skin with no significant pathologic finding is noted in the specimen.b     NO EVIDENCE OF NEOPLASIA IDENTIFIED IN 23 SUBMITTED LYMPH NODES.     C. TERMINAL ILEUM WITH RIGHT HEMICOLECTOMY: PORTION OF SMALL INTESTINE WITH NO SIGNIFICANT PATHOLOGIC FINDINGS.     She was last seen 12/1/23.    Denies any F/C, N/V, CP/SOB, dysuria.   Appetite: good  Energy: good  Weight: down about  5-6 lbs  BM: once daily      Review of Systems   Constitutional:  Negative for chills and fever.        Gained a few lbs since last visit   Respiratory:  Negative for apnea, cough, chest tightness and shortness of breath.    Cardiovascular:  Negative for chest pain, palpitations and leg swelling.   Gastrointestinal:         As noted in HPI   Genitourinary:  Negative for dysuria.   Neurological:  Negative for dizziness, light-headedness and headaches.   Hematological:  Negative for adenopathy. Does not bruise/bleed easily.   Psychiatric/Behavioral:  Negative for suicidal ideas.         Physical Exam  Constitutional:       Appearance: Normal appearance.   HENT:      Head: Normocephalic.   Neck:      Vascular: No carotid bruit.   Cardiovascular:      Rate and Rhythm: Normal rate and regular rhythm.      Pulses: Normal pulses.      Heart sounds: Normal heart sounds.   Pulmonary:      Effort: Pulmonary effort is normal.      Breath sounds: Normal breath sounds.  "  Abdominal:      Comments: Soft, NT, ND. Incision is clean and dry.   Musculoskeletal:      Cervical back: No tenderness.   Lymphadenopathy:      Cervical: No cervical adenopathy.   Neurological:      General: No focal deficit present.      Mental Status: She is alert and oriented to person, place, and time.   Psychiatric:         Mood and Affect: Mood normal.         Behavior: Behavior normal.       Last Recorded Vitals.vs  /61   Pulse 52   Temp 36.3 °C (97.4 °F)   Ht 1.702 m (5' 7\")   Wt 62.1 kg (137 lb)   BMI 21.46 kg/m²     Assessment:  63F with h/o ascending colon cancer  S/p right colectomy en bloc, abdominal wall resection on 11/13/23 for T3N0 cancer  Doing well post-op    Plan:  -Diet: OK to slowly advance diet   -Activity: Normal activities can be resumed. No lifting greater than 10 lbs for a full 6 weeks following surgery   -The follow up protocol for colon cancer was outlined for the patient. These recommendations are based on the [facility] Practice Parameters. She will follow with Dr. Kruse  -Office visit and CEA every 3-6 months for 2 years and then every 6 months until 5 years  -CT C/A/P every year for 5 years  -Colonoscopy 1 year after diagnosis and subsequent ones determined upon findings.  All questions and concerns were addressed with patient and     PRABHJOT Verdugo-CNP    "

## 2024-02-05 ENCOUNTER — OFFICE VISIT (OUTPATIENT)
Dept: HEMATOLOGY/ONCOLOGY | Facility: HOSPITAL | Age: 64
End: 2024-02-05
Payer: COMMERCIAL

## 2024-02-05 VITALS
SYSTOLIC BLOOD PRESSURE: 127 MMHG | TEMPERATURE: 96.8 F | WEIGHT: 137.9 LBS | OXYGEN SATURATION: 99 % | HEIGHT: 67 IN | DIASTOLIC BLOOD PRESSURE: 76 MMHG | HEART RATE: 73 BPM | BODY MASS INDEX: 21.64 KG/M2 | RESPIRATION RATE: 16 BRPM

## 2024-02-05 DIAGNOSIS — C18.0 CECAL CANCER (MULTI): Primary | ICD-10-CM

## 2024-02-05 PROCEDURE — 99213 OFFICE O/P EST LOW 20 MIN: CPT | Performed by: STUDENT IN AN ORGANIZED HEALTH CARE EDUCATION/TRAINING PROGRAM

## 2024-02-05 PROCEDURE — 99213 OFFICE O/P EST LOW 20 MIN: CPT | Mod: GC | Performed by: STUDENT IN AN ORGANIZED HEALTH CARE EDUCATION/TRAINING PROGRAM

## 2024-02-05 PROCEDURE — 1036F TOBACCO NON-USER: CPT | Performed by: STUDENT IN AN ORGANIZED HEALTH CARE EDUCATION/TRAINING PROGRAM

## 2024-02-05 ASSESSMENT — PATIENT HEALTH QUESTIONNAIRE - PHQ9
1. LITTLE INTEREST OR PLEASURE IN DOING THINGS: NOT AT ALL
2. FEELING DOWN, DEPRESSED OR HOPELESS: NOT AT ALL
SUM OF ALL RESPONSES TO PHQ9 QUESTIONS 1 AND 2: 0

## 2024-02-05 ASSESSMENT — PAIN SCALES - GENERAL: PAINLEVEL: 0-NO PAIN

## 2024-02-05 NOTE — PROGRESS NOTES
Cancer History:  DIAGNOSIS: Cecal cancer    STAGING: pT3 pN0 Mx    CURRENT SITES OF DISEASE: None    MOLECULAR/GENOMIC:  MMR-intact    TUMOR MARKER:   CEA 11/2023 1.8  CEA 12/11/23 1.6    CURRENT TREATMENT:       PRIOR TREATMENT:   11/13/23: S/p R colectomy with en bloc abdominal wall resection    HISTORY:   6/2023: Presented with R groin mass. Found to have abscess in the groin. Ultrasound should complex solid & cystic mass with channel draining to the surface, recommend CT.  7/28/23: CT showed suspicious masslike thickening of the cecum and appendix for malignancy, which extends into the R inguinal canal. Nonspecific inguinal and R lower quadrant mesenteric lymphadenopathy and/or adjuacent soft tissue metastatic deposits. Nonspecific 8mm hepatic hypodensity.   9/11/23: C-scope showed ulcerated nearly complete obstructing large mass in the proximal ascending colon & cecum, mass was circumferential. 5mm polyp in sigmoid colon. Pathology of mass showed invasive moderately differentiated adenocarcinoma. Polyp was hyperplastic.  9/27/23: PET CT showed FDG avid cecal wall thickening possibly extending to the parietal peritoneum, additional nonspecific hypermetabolic focus along the cecal wall superolateral to the mass, may be physiologic with an additional site of neoplasm not excluded.  11/13/23: Underwent R colectomy with en bloc abdominal wall resection. Intra-op cecum was densely adherent to the underside of the abdominal wall and lateral aspect of the inguinal canal, there was a palpable tumor at base of the cecum. Appendix was residing within the inguinal canal and scarred inplace due to previous perforation and abscess. Final pathology showed invasive low-grade adenocarcinoma, grade 2 moderately differentiated, no macroscopic tumor perforation, no LVI, no PNI, negative margins 0/23 LNs.     PMH: HLD    SH: , no tobacco, illicits, EtOH    FH: No FH of malignancy    Subjective:   Patient is feeling well. She  "is healed from surgery. She has no complaints today. She denies any fevers, chills, chest pain, shortness of breath, abdominal pain, nausea, vomiting, diarrhea, or rashes.    ROS as above. Remainder of 10-point review of systems elicited and negative.    Objective:  /69 (BP Location: Left arm, Patient Position: Sitting, BP Cuff Size: Adult)   Pulse 70   Temp 37.2 °C (99 °F)   Resp 16   Ht (S) 1.699 m (5' 6.89\")   Wt 60.7 kg (133 lb 14.4 oz)   SpO2 100%   BMI 21.04 kg/m²     PE:  Gen: A&O, NAD  Head: Normocephalic, atraumatic  Eyes: no scleral icterus  ENT: mucous membranes moist, no oropharyngeal lesions  Resp: Lungs CTAB  Cardiac: Normal rate, regular rhythm, no murmurs appreciated  Abdomen: Soft, nondistended, nontender, +BS. Incisions c/d/I, well healed  Neuro: CNII-XII grossly intact  Psych: appropriate mood & affect  Skin: warm, dry, no apparent rashes     ECOG Performance Status: 0      Assessment & Plan:   Martine Cornejo is a 63 y.o. female with Stage IIA cecal cancer that presented 6/2023 after obstructing the appendix causing appendiceal abscess tracking to the R groin.      Colonoscopy 9/11/23 led to the diagnosis of cecal cancer, and she underwent surgical resection on 11/13/23 with Dr. Osuna.     Final pathology showed pT3 pN0 cecal cancer with no high risk features. I discussed with her that while the cecal mass itself was obstructing the appendix leading to intra-abdominal abscess tracking to the groin, this is the only significant high risk feature. I discussed the option of adjuvant chemotherapy and general risks and benefits, and she is motivated for close surveillance over adjuvant therapy. I am fully supportive of this approach.    2/5: Last CEA 1.6 (12/11/23). Signatera at that time was negative. She is clinically doing well and will continue on surveillance. Her next CT is due in 3 months.     Plan:   Stage IIA Cecal Cancer  - Signatera, CEA, CBC, CMP q3mo for first 2 years then can " space less frequently  - CT in 3mo  - C-scope 1 year from surgery: due 11/2024  - RTC 3mo with labs    Patient seen and examined with Dr. Kruse.

## 2024-02-05 NOTE — PROGRESS NOTES
Patient here today with daughter for followup visit. Denies n/v/d; no other complaints. Medications and allergies reviewed with patient.

## 2024-02-15 LAB
AP SUMMARY REPORT: NORMAL
SCAN RESULT: NORMAL

## 2024-03-11 ENCOUNTER — TELEPHONE (OUTPATIENT)
Dept: HEMATOLOGY/ONCOLOGY | Facility: HOSPITAL | Age: 64
End: 2024-03-11
Payer: COMMERCIAL

## 2024-03-11 DIAGNOSIS — C18.0 CECAL CANCER (MULTI): ICD-10-CM

## 2024-03-11 NOTE — TELEPHONE ENCOUNTER
Spoke to patient and explained Signatera is negative, Pt had no further questions and she will see Dr. Kruse after her Ct scan.

## 2024-03-25 NOTE — PROGRESS NOTES
Chief Complaint:       History Of Present Illness  Martine Cornejo is a 63 y.o. female with Stage IIA cecal cancer s/p laparotomy with right colectomy en bloc abdominal wall resection on 11/13/23. Pathology demonstrated pT3N0, all lymph nodes and margins negative for involvement. She has established care with Medical Oncology and Dr. Kruse agrees with close surveillance > adjuvant chemotherapy. Signatera negative in February. CT C/A/P planned for May. Colonoscopy 1 year from surgery: due 11/2024. She presents to clinic today for 3 months FUV.         Past Medical History  She has a past medical history of Adenocarcinoma of colon (CMS/HCC), Chicken pox, Groin mass, Hyperlipidemia, Mononucleosis, and Vision loss.    Surgical History  She has a past surgical history that includes Colonoscopy.     Social History  She reports that she has never smoked. She has never used smokeless tobacco. She reports current alcohol use of about 2.0 standard drinks of alcohol per week. She reports that she does not use drugs.    Family History  Family History   Problem Relation Name Age of Onset    Coronary artery disease Father          Allergies  Keflex [cephalexin] and Penicillins    Home Medications  Prior to Admission medications    Medication Sig Start Date End Date Taking? Authorizing Provider   acetaminophen (Tylenol) 500 mg capsule Take 500 mg by mouth every 6 hours starting two days prior to surgery. Take 1000 mg by mouth the morning of surgery, at least 1 hour prior to arrival time. 10/4/23   Sven Osuna MD   atorvastatin (Lipitor) 20 mg tablet Take 1 tablet (20 mg) by mouth once daily. 7/31/23   Historical Provider, MD         Review of Systems   Constitutional:  Negative for activity change, appetite change, fatigue, fever and unexpected weight change.   HENT:  Negative for sore throat.    Eyes:  Negative for visual disturbance.   Respiratory:  Negative for shortness of breath.    Cardiovascular:  Negative for chest  pain, palpitations and leg swelling.   Gastrointestinal:  Negative for abdominal distention, abdominal pain, anal bleeding, blood in stool, constipation, diarrhea, nausea, rectal pain and vomiting.   Endocrine: Negative for polydipsia.   Genitourinary:  Negative for difficulty urinating and dysuria.   Musculoskeletal:  Negative for arthralgias.   Skin:  Negative for wound.   Neurological:  Negative for dizziness, weakness and light-headedness.   Hematological:  Negative for adenopathy.   Psychiatric/Behavioral:  Negative for confusion.          Imaging:  No results found.       Labs:   Lab Results   Component Value Date    AST 20 12/11/2023    ALT 19 12/11/2023    ALKPHOS 90 12/11/2023    PROT 7.4 12/11/2023    ALBUMIN 4.1 12/11/2023    CEA 1.6 12/11/2023          Physical Exam  Vitals reviewed. Exam conducted with a chaperone present.   Constitutional:       Appearance: Normal appearance.   Cardiovascular:      Rate and Rhythm: Normal rate and regular rhythm.   Pulmonary:      Effort: Pulmonary effort is normal.      Breath sounds: Normal breath sounds.   Abdominal:      General: Abdomen is flat. There is no distension.      Palpations: Abdomen is soft. There is no mass.      Tenderness: There is no abdominal tenderness.      Hernia: No hernia is present.      Comments: Right groin with clean scar. No masses.   Neurological:      General: No focal deficit present.      Mental Status: She is alert and oriented to person, place, and time.   Psychiatric:         Mood and Affect: Mood normal.         Behavior: Behavior normal.            Last Recorded Vitals  There were no vitals taken for this visit.      Assessment/Plan  History of locally-advanced cecal cancer s/p right colectomy w/ enbloc abdominal wall resection. Doing well. MARC. Scheduled for surveillance CT in May. RTC for check up in August.      Dr. Sven Osuna   3/25/2024

## 2024-03-26 ENCOUNTER — OFFICE VISIT (OUTPATIENT)
Dept: SURGERY | Facility: CLINIC | Age: 64
End: 2024-03-26
Payer: COMMERCIAL

## 2024-03-26 VITALS
BODY MASS INDEX: 22.27 KG/M2 | HEIGHT: 67 IN | RESPIRATION RATE: 16 BRPM | SYSTOLIC BLOOD PRESSURE: 133 MMHG | DIASTOLIC BLOOD PRESSURE: 80 MMHG | WEIGHT: 141.9 LBS | HEART RATE: 77 BPM

## 2024-03-26 DIAGNOSIS — Z85.038 HISTORY OF COLON CANCER: Primary | ICD-10-CM

## 2024-03-26 PROCEDURE — 99212 OFFICE O/P EST SF 10 MIN: CPT | Performed by: COLON & RECTAL SURGERY

## 2024-03-26 PROCEDURE — 1036F TOBACCO NON-USER: CPT | Performed by: COLON & RECTAL SURGERY

## 2024-03-26 ASSESSMENT — ENCOUNTER SYMPTOMS
ACTIVITY CHANGE: 0
UNEXPECTED WEIGHT CHANGE: 0
DIARRHEA: 0
ABDOMINAL PAIN: 0
CONSTIPATION: 0
DYSURIA: 0
BLOOD IN STOOL: 0
CONFUSION: 0
PALPITATIONS: 0
SORE THROAT: 0
SHORTNESS OF BREATH: 0
NAUSEA: 0
ABDOMINAL DISTENTION: 0
FATIGUE: 0
POLYDIPSIA: 0
ADENOPATHY: 0
DIZZINESS: 0
WEAKNESS: 0
LIGHT-HEADEDNESS: 0
ANAL BLEEDING: 0
FEVER: 0
APPETITE CHANGE: 0
ARTHRALGIAS: 0
WOUND: 0
DIFFICULTY URINATING: 0
VOMITING: 0
RECTAL PAIN: 0

## 2024-03-26 ASSESSMENT — PAIN SCALES - GENERAL: PAINLEVEL: 0-NO PAIN

## 2024-05-06 ENCOUNTER — TELEPHONE (OUTPATIENT)
Dept: HEMATOLOGY/ONCOLOGY | Facility: HOSPITAL | Age: 64
End: 2024-05-06

## 2024-05-06 ENCOUNTER — APPOINTMENT (OUTPATIENT)
Dept: RADIOLOGY | Facility: CLINIC | Age: 64
End: 2024-05-06
Payer: COMMERCIAL

## 2024-05-06 ENCOUNTER — LAB (OUTPATIENT)
Dept: LAB | Facility: LAB | Age: 64
End: 2024-05-06
Payer: COMMERCIAL

## 2024-05-06 DIAGNOSIS — Z01.812 PRE-PROCEDURAL LABORATORY EXAMINATION: ICD-10-CM

## 2024-05-06 DIAGNOSIS — C18.0 CECAL CANCER (MULTI): ICD-10-CM

## 2024-05-06 DIAGNOSIS — Z01.812 PRE-PROCEDURAL LABORATORY EXAMINATION: Primary | ICD-10-CM

## 2024-05-06 LAB
ALBUMIN SERPL BCP-MCNC: 4.4 G/DL (ref 3.4–5)
ALP SERPL-CCNC: 92 U/L (ref 33–136)
ALT SERPL W P-5'-P-CCNC: 24 U/L (ref 7–45)
ANION GAP SERPL CALC-SCNC: 9 MMOL/L (ref 10–20)
AST SERPL W P-5'-P-CCNC: 23 U/L (ref 9–39)
BASOPHILS # BLD AUTO: 0.04 X10*3/UL (ref 0–0.1)
BASOPHILS NFR BLD AUTO: 0.8 %
BILIRUB SERPL-MCNC: 0.8 MG/DL (ref 0–1.2)
BUN SERPL-MCNC: 15 MG/DL (ref 6–23)
CALCIUM SERPL-MCNC: 10 MG/DL (ref 8.6–10.6)
CEA SERPL-MCNC: 2.2 UG/L
CHLORIDE SERPL-SCNC: 104 MMOL/L (ref 98–107)
CO2 SERPL-SCNC: 32 MMOL/L (ref 21–32)
CREAT SERPL-MCNC: 0.73 MG/DL (ref 0.5–1.05)
EGFRCR SERPLBLD CKD-EPI 2021: >90 ML/MIN/1.73M*2
EOSINOPHIL # BLD AUTO: 0.11 X10*3/UL (ref 0–0.7)
EOSINOPHIL NFR BLD AUTO: 2.1 %
ERYTHROCYTE [DISTWIDTH] IN BLOOD BY AUTOMATED COUNT: 13.2 % (ref 11.5–14.5)
GLUCOSE SERPL-MCNC: 86 MG/DL (ref 74–99)
HCT VFR BLD AUTO: 39.6 % (ref 36–46)
HGB BLD-MCNC: 13 G/DL (ref 12–16)
IMM GRANULOCYTES # BLD AUTO: 0.01 X10*3/UL (ref 0–0.7)
IMM GRANULOCYTES NFR BLD AUTO: 0.2 % (ref 0–0.9)
LYMPHOCYTES # BLD AUTO: 1.53 X10*3/UL (ref 1.2–4.8)
LYMPHOCYTES NFR BLD AUTO: 29.4 %
MCH RBC QN AUTO: 30 PG (ref 26–34)
MCHC RBC AUTO-ENTMCNC: 32.8 G/DL (ref 32–36)
MCV RBC AUTO: 92 FL (ref 80–100)
MONOCYTES # BLD AUTO: 0.44 X10*3/UL (ref 0.1–1)
MONOCYTES NFR BLD AUTO: 8.5 %
NEUTROPHILS # BLD AUTO: 3.07 X10*3/UL (ref 1.2–7.7)
NEUTROPHILS NFR BLD AUTO: 59 %
NRBC BLD-RTO: 0 /100 WBCS (ref 0–0)
PLATELET # BLD AUTO: 308 X10*3/UL (ref 150–450)
POTASSIUM SERPL-SCNC: 4 MMOL/L (ref 3.5–5.3)
PROT SERPL-MCNC: 6.9 G/DL (ref 6.4–8.2)
RBC # BLD AUTO: 4.33 X10*6/UL (ref 4–5.2)
SODIUM SERPL-SCNC: 141 MMOL/L (ref 136–145)
WBC # BLD AUTO: 5.2 X10*3/UL (ref 4.4–11.3)

## 2024-05-06 PROCEDURE — 82378 CARCINOEMBRYONIC ANTIGEN: CPT

## 2024-05-06 PROCEDURE — 36415 COLL VENOUS BLD VENIPUNCTURE: CPT

## 2024-05-06 PROCEDURE — 80053 COMPREHEN METABOLIC PANEL: CPT

## 2024-05-06 PROCEDURE — 85025 COMPLETE CBC W/AUTO DIFF WBC: CPT

## 2024-05-08 ENCOUNTER — HOSPITAL ENCOUNTER (OUTPATIENT)
Dept: RADIOLOGY | Facility: CLINIC | Age: 64
Discharge: HOME | End: 2024-05-08
Payer: COMMERCIAL

## 2024-05-08 DIAGNOSIS — C18.0 CECAL CANCER (MULTI): ICD-10-CM

## 2024-05-08 PROCEDURE — 71260 CT THORAX DX C+: CPT | Performed by: RADIOLOGY

## 2024-05-08 PROCEDURE — 74177 CT ABD & PELVIS W/CONTRAST: CPT | Performed by: RADIOLOGY

## 2024-05-08 PROCEDURE — 2550000001 HC RX 255 CONTRASTS: Performed by: STUDENT IN AN ORGANIZED HEALTH CARE EDUCATION/TRAINING PROGRAM

## 2024-05-08 PROCEDURE — 74177 CT ABD & PELVIS W/CONTRAST: CPT

## 2024-05-08 RX ADMIN — IOHEXOL 90 ML: 350 INJECTION, SOLUTION INTRAVENOUS at 11:59

## 2024-05-13 ENCOUNTER — OFFICE VISIT (OUTPATIENT)
Dept: HEMATOLOGY/ONCOLOGY | Facility: HOSPITAL | Age: 64
End: 2024-05-13
Payer: COMMERCIAL

## 2024-05-13 ENCOUNTER — LAB (OUTPATIENT)
Dept: LAB | Facility: HOSPITAL | Age: 64
End: 2024-05-13
Payer: COMMERCIAL

## 2024-05-13 VITALS
HEIGHT: 67 IN | HEART RATE: 51 BPM | OXYGEN SATURATION: 97 % | TEMPERATURE: 99 F | RESPIRATION RATE: 16 BRPM | BODY MASS INDEX: 22.22 KG/M2 | DIASTOLIC BLOOD PRESSURE: 81 MMHG | SYSTOLIC BLOOD PRESSURE: 124 MMHG | WEIGHT: 141.6 LBS

## 2024-05-13 DIAGNOSIS — C18.0 CECAL CANCER (MULTI): ICD-10-CM

## 2024-05-13 DIAGNOSIS — C18.2 MALIGNANT NEOPLASM OF RIGHT COLON (MULTI): ICD-10-CM

## 2024-05-13 LAB
ALBUMIN SERPL BCP-MCNC: 4.7 G/DL (ref 3.4–5)
ALP SERPL-CCNC: 99 U/L (ref 33–136)
ALT SERPL W P-5'-P-CCNC: 22 U/L (ref 7–45)
ANION GAP SERPL CALC-SCNC: 10 MMOL/L (ref 10–20)
AST SERPL W P-5'-P-CCNC: 24 U/L (ref 9–39)
BASOPHILS # BLD AUTO: 0.04 X10*3/UL (ref 0–0.1)
BASOPHILS NFR BLD AUTO: 0.7 %
BILIRUB SERPL-MCNC: 0.6 MG/DL (ref 0–1.2)
BUN SERPL-MCNC: 14 MG/DL (ref 6–23)
CALCIUM SERPL-MCNC: 10.2 MG/DL (ref 8.6–10.3)
CEA SERPL-MCNC: 2 UG/L
CHLORIDE SERPL-SCNC: 104 MMOL/L (ref 98–107)
CO2 SERPL-SCNC: 32 MMOL/L (ref 21–32)
CREAT SERPL-MCNC: 0.71 MG/DL (ref 0.5–1.05)
EGFRCR SERPLBLD CKD-EPI 2021: >90 ML/MIN/1.73M*2
EOSINOPHIL # BLD AUTO: 0.14 X10*3/UL (ref 0–0.7)
EOSINOPHIL NFR BLD AUTO: 2.5 %
ERYTHROCYTE [DISTWIDTH] IN BLOOD BY AUTOMATED COUNT: 13.3 % (ref 11.5–14.5)
GLUCOSE SERPL-MCNC: 94 MG/DL (ref 74–99)
HCT VFR BLD AUTO: 40.4 % (ref 36–46)
HGB BLD-MCNC: 13.7 G/DL (ref 12–16)
IMM GRANULOCYTES # BLD AUTO: 0.01 X10*3/UL (ref 0–0.7)
IMM GRANULOCYTES NFR BLD AUTO: 0.2 % (ref 0–0.9)
LYMPHOCYTES # BLD AUTO: 1.64 X10*3/UL (ref 1.2–4.8)
LYMPHOCYTES NFR BLD AUTO: 29.5 %
MCH RBC QN AUTO: 30.2 PG (ref 26–34)
MCHC RBC AUTO-ENTMCNC: 33.9 G/DL (ref 32–36)
MCV RBC AUTO: 89 FL (ref 80–100)
MONOCYTES # BLD AUTO: 0.4 X10*3/UL (ref 0.1–1)
MONOCYTES NFR BLD AUTO: 7.2 %
NEUTROPHILS # BLD AUTO: 3.32 X10*3/UL (ref 1.2–7.7)
NEUTROPHILS NFR BLD AUTO: 59.9 %
NRBC BLD-RTO: 0 /100 WBCS (ref 0–0)
PLATELET # BLD AUTO: 302 X10*3/UL (ref 150–450)
POTASSIUM SERPL-SCNC: 4.4 MMOL/L (ref 3.5–5.3)
PROT SERPL-MCNC: 7.7 G/DL (ref 6.4–8.2)
RBC # BLD AUTO: 4.53 X10*6/UL (ref 4–5.2)
SODIUM SERPL-SCNC: 142 MMOL/L (ref 136–145)
WBC # BLD AUTO: 5.6 X10*3/UL (ref 4.4–11.3)

## 2024-05-13 PROCEDURE — 85025 COMPLETE CBC W/AUTO DIFF WBC: CPT

## 2024-05-13 PROCEDURE — 99214 OFFICE O/P EST MOD 30 MIN: CPT | Performed by: STUDENT IN AN ORGANIZED HEALTH CARE EDUCATION/TRAINING PROGRAM

## 2024-05-13 PROCEDURE — 80053 COMPREHEN METABOLIC PANEL: CPT

## 2024-05-13 PROCEDURE — 82378 CARCINOEMBRYONIC ANTIGEN: CPT

## 2024-05-13 PROCEDURE — 36415 COLL VENOUS BLD VENIPUNCTURE: CPT

## 2024-05-13 ASSESSMENT — PAIN SCALES - GENERAL: PAINLEVEL: 0-NO PAIN

## 2024-05-13 ASSESSMENT — COLUMBIA-SUICIDE SEVERITY RATING SCALE - C-SSRS
1. IN THE PAST MONTH, HAVE YOU WISHED YOU WERE DEAD OR WISHED YOU COULD GO TO SLEEP AND NOT WAKE UP?: NO
2. HAVE YOU ACTUALLY HAD ANY THOUGHTS OF KILLING YOURSELF?: NO
6. HAVE YOU EVER DONE ANYTHING, STARTED TO DO ANYTHING, OR PREPARED TO DO ANYTHING TO END YOUR LIFE?: NO

## 2024-05-13 ASSESSMENT — PATIENT HEALTH QUESTIONNAIRE - PHQ9
SUM OF ALL RESPONSES TO PHQ9 QUESTIONS 1 AND 2: 0
1. LITTLE INTEREST OR PLEASURE IN DOING THINGS: NOT AT ALL
2. FEELING DOWN, DEPRESSED OR HOPELESS: NOT AT ALL

## 2024-05-13 ASSESSMENT — ENCOUNTER SYMPTOMS: DEPRESSION: 0

## 2024-05-13 NOTE — PROGRESS NOTES
Cancer History:  DIAGNOSIS: Cecal cancer    STAGING: pT3 pN0 Mx    CURRENT SITES OF DISEASE: None    MOLECULAR/GENOMIC:  MMR-intact    TUMOR MARKER:   CEA 11/2023 1.8    CURRENT TREATMENT:       PRIOR TREATMENT:   11/13/23: S/p R colectomy with en bloc abdominal wall resection    HISTORY:   6/2023: Presented with R groin mass. Found to have abscess in the groin. Ultrasound should complex solid & cystic mass with channel draining to the surface, recommend CT.  7/28/23: CT showed suspicious masslike thickening of the cecum and appendix for malignancy, which extends into the R inguinal canal. Nonspecific inguinal and R lower quadrant mesenteric lymphadenopathy and/or adjuacent soft tissue metastatic deposits. Nonspecific 8mm hepatic hypodensity.   9/11/23: C-scope showed ulcerated nearly complete obstructing large mass in the proximal ascending colon & cecum, mass was circumferential. 5mm polyp in sigmoid colon. Pathology of mass showed invasive moderately differentiated adenocarcinoma. Polyp was hyperplastic.  9/27/23: PET CT showed FDG avid cecal wall thickening possibly extending to the parietal peritoneum, additional nonspecific hypermetabolic focus along the cecal wall superolateral to the mass, may be physiologic with an additional site of neoplasm not excluded.  11/13/23: Underwent R colectomy with en bloc abdominal wall resection. Intra-op cecum was densely adherent to the underside of the abdominal wall and lateral aspect of the inguinal canal, there was a palpable tumor at base of the cecum. Appendix was residing within the inguinal canal and scarred inplace due to previous perforation and abscess. Final pathology showed invasive low-grade adenocarcinoma, grade 2 moderately differentiated, no macroscopic tumor perforation, no LVI, no PNI, negative margins 0/23 LNs.     PMH: HLD    SH: , no tobacco, illicits, EtOH    FH: No FH of malignancy    Subjective:   Continues to feel very well, no  "symptoms.    No fevers, chills, chest pain, shortness of breath, abdominal pain, nausea, vomiting, diarrhea, or rashes.    ROS as above. Remainder of 10-point review of systems elicited and negative.    Objective:  /81 (BP Location: Left arm, Patient Position: Sitting, BP Cuff Size: Adult)   Pulse 51   Temp 37.2 °C (99 °F)   Resp 16   Ht (S) 1.699 m (5' 6.89\")   Wt 64.2 kg (141 lb 9.6 oz)   SpO2 97%   BMI 22.25 kg/m²     PE:  Gen: A&O, NAD  Head: Normocephalic, atraumatic  Eyes: no scleral icterus  ENT: mucous membranes moist, no oropharyngeal lesions  Resp: Lungs CTAB  Cardiac: Normal rate, regular rhythm, no murmurs appreciated  Abdomen: Soft, nondistended, nontender, +BS. Incisions c/d/I, healing well  Neuro: CNII-XII grossly intact  Psych: appropriate mood & affect  Skin: warm, dry, no apparent rashes     ECOG Performance Status: 0      Assessment & Plan:   Martine Cornejo is a 63 y.o. female with Stage IIA cecal cancer that presented 6/2023 after obstructing the appendix causing appendiceal abscess tracking to the R groin.      Colonoscopy 9/11/23 led to the diagnosis of cecal cancer, and she underwent surgical resection on 11/13/23 with Dr. Osuna.     Final pathology showed pT3 pN0 cecal cancer with no high risk features. I discussed with her that while the cecal mass itself was obstructing the appendix leading to intra-abdominal abscess tracking to the groin, this is the only significant high risk feature. I discussed the option of adjuvant chemotherapy and general risks and benefits, and she is motivated for close surveillance over adjuvant therapy. I am fully supportive of this approach.    5/13/24: I personally reviewed the images from the recent CT, which show no evidence of disease. Small thyroid nodule is noted, will order thyroid ultrasound per radiologist recommendations.      Plan:   Stage IIA Cecal Cancer  - Signatera, CEA, CBC, CMP q3mo for first 2 years then can space less frequently  - CT " in 6mo  - C-scope 1 year from surgery: due 11/2024  - RTC 3mo with labs

## 2024-06-13 ENCOUNTER — HOSPITAL ENCOUNTER (OUTPATIENT)
Dept: RADIOLOGY | Facility: CLINIC | Age: 64
Discharge: HOME | End: 2024-06-13
Payer: COMMERCIAL

## 2024-06-13 DIAGNOSIS — C18.0 CECAL CANCER (MULTI): ICD-10-CM

## 2024-06-13 PROCEDURE — 76536 US EXAM OF HEAD AND NECK: CPT | Performed by: RADIOLOGY

## 2024-06-13 PROCEDURE — 76536 US EXAM OF HEAD AND NECK: CPT

## 2024-08-26 ENCOUNTER — OFFICE VISIT (OUTPATIENT)
Dept: HEMATOLOGY/ONCOLOGY | Facility: HOSPITAL | Age: 64
End: 2024-08-26
Payer: COMMERCIAL

## 2024-08-26 ENCOUNTER — LAB (OUTPATIENT)
Dept: LAB | Facility: HOSPITAL | Age: 64
End: 2024-08-26
Payer: COMMERCIAL

## 2024-08-26 VITALS
BODY MASS INDEX: 21.34 KG/M2 | SYSTOLIC BLOOD PRESSURE: 121 MMHG | WEIGHT: 135.8 LBS | TEMPERATURE: 97.3 F | RESPIRATION RATE: 18 BRPM | DIASTOLIC BLOOD PRESSURE: 79 MMHG | HEART RATE: 55 BPM | OXYGEN SATURATION: 98 %

## 2024-08-26 DIAGNOSIS — C18.0 CECAL CANCER (MULTI): ICD-10-CM

## 2024-08-26 LAB
ALBUMIN SERPL BCP-MCNC: 4.1 G/DL (ref 3.4–5)
ALP SERPL-CCNC: 85 U/L (ref 33–136)
ALT SERPL W P-5'-P-CCNC: 19 U/L (ref 7–45)
ANION GAP SERPL CALC-SCNC: 12 MMOL/L (ref 10–20)
AST SERPL W P-5'-P-CCNC: 21 U/L (ref 9–39)
BASOPHILS # BLD AUTO: 0.06 X10*3/UL (ref 0–0.1)
BASOPHILS NFR BLD AUTO: 0.9 %
BILIRUB SERPL-MCNC: 0.6 MG/DL (ref 0–1.2)
BUN SERPL-MCNC: 19 MG/DL (ref 6–23)
CALCIUM SERPL-MCNC: 9.8 MG/DL (ref 8.6–10.3)
CEA SERPL-MCNC: 1.8 UG/L
CHLORIDE SERPL-SCNC: 104 MMOL/L (ref 98–107)
CO2 SERPL-SCNC: 29 MMOL/L (ref 21–32)
CREAT SERPL-MCNC: 0.75 MG/DL (ref 0.5–1.05)
EGFRCR SERPLBLD CKD-EPI 2021: 88 ML/MIN/1.73M*2
EOSINOPHIL # BLD AUTO: 0.15 X10*3/UL (ref 0–0.7)
EOSINOPHIL NFR BLD AUTO: 2.3 %
ERYTHROCYTE [DISTWIDTH] IN BLOOD BY AUTOMATED COUNT: 12.9 % (ref 11.5–14.5)
GLUCOSE SERPL-MCNC: 62 MG/DL (ref 74–99)
HCT VFR BLD AUTO: 40.6 % (ref 36–46)
HGB BLD-MCNC: 13.3 G/DL (ref 12–16)
IMM GRANULOCYTES # BLD AUTO: 0.01 X10*3/UL (ref 0–0.7)
IMM GRANULOCYTES NFR BLD AUTO: 0.2 % (ref 0–0.9)
LYMPHOCYTES # BLD AUTO: 2 X10*3/UL (ref 1.2–4.8)
LYMPHOCYTES NFR BLD AUTO: 30.5 %
MCH RBC QN AUTO: 31 PG (ref 26–34)
MCHC RBC AUTO-ENTMCNC: 32.8 G/DL (ref 32–36)
MCV RBC AUTO: 95 FL (ref 80–100)
MONOCYTES # BLD AUTO: 0.54 X10*3/UL (ref 0.1–1)
MONOCYTES NFR BLD AUTO: 8.2 %
NEUTROPHILS # BLD AUTO: 3.8 X10*3/UL (ref 1.2–7.7)
NEUTROPHILS NFR BLD AUTO: 57.9 %
NRBC BLD-RTO: 0 /100 WBCS (ref 0–0)
PLATELET # BLD AUTO: 274 X10*3/UL (ref 150–450)
POTASSIUM SERPL-SCNC: 4 MMOL/L (ref 3.5–5.3)
PROT SERPL-MCNC: 7 G/DL (ref 6.4–8.2)
RBC # BLD AUTO: 4.29 X10*6/UL (ref 4–5.2)
SODIUM SERPL-SCNC: 141 MMOL/L (ref 136–145)
WBC # BLD AUTO: 6.6 X10*3/UL (ref 4.4–11.3)

## 2024-08-26 PROCEDURE — 1159F MED LIST DOCD IN RCRD: CPT | Performed by: STUDENT IN AN ORGANIZED HEALTH CARE EDUCATION/TRAINING PROGRAM

## 2024-08-26 PROCEDURE — G2211 COMPLEX E/M VISIT ADD ON: HCPCS | Performed by: STUDENT IN AN ORGANIZED HEALTH CARE EDUCATION/TRAINING PROGRAM

## 2024-08-26 PROCEDURE — 82378 CARCINOEMBRYONIC ANTIGEN: CPT

## 2024-08-26 PROCEDURE — 99213 OFFICE O/P EST LOW 20 MIN: CPT | Performed by: STUDENT IN AN ORGANIZED HEALTH CARE EDUCATION/TRAINING PROGRAM

## 2024-08-26 PROCEDURE — 1036F TOBACCO NON-USER: CPT | Performed by: STUDENT IN AN ORGANIZED HEALTH CARE EDUCATION/TRAINING PROGRAM

## 2024-08-26 PROCEDURE — 80053 COMPREHEN METABOLIC PANEL: CPT

## 2024-08-26 PROCEDURE — 36415 COLL VENOUS BLD VENIPUNCTURE: CPT

## 2024-08-26 PROCEDURE — 1126F AMNT PAIN NOTED NONE PRSNT: CPT | Performed by: STUDENT IN AN ORGANIZED HEALTH CARE EDUCATION/TRAINING PROGRAM

## 2024-08-26 PROCEDURE — 85025 COMPLETE CBC W/AUTO DIFF WBC: CPT

## 2024-08-26 ASSESSMENT — PAIN SCALES - GENERAL: PAINLEVEL: 0-NO PAIN

## 2024-08-26 NOTE — PROGRESS NOTES
Cancer History:  DIAGNOSIS: Cecal cancer    STAGING: pT3 pN0 Mx    CURRENT SITES OF DISEASE: None    MOLECULAR/GENOMIC:  MMR-intact    TUMOR MARKER:   CEA 11/2023 1.8    CURRENT TREATMENT:       PRIOR TREATMENT:   11/13/23: S/p R colectomy with en bloc abdominal wall resection    HISTORY:   6/2023: Presented with R groin mass. Found to have abscess in the groin. Ultrasound should complex solid & cystic mass with channel draining to the surface, recommend CT.  7/28/23: CT showed suspicious masslike thickening of the cecum and appendix for malignancy, which extends into the R inguinal canal. Nonspecific inguinal and R lower quadrant mesenteric lymphadenopathy and/or adjuacent soft tissue metastatic deposits. Nonspecific 8mm hepatic hypodensity.   9/11/23: C-scope showed ulcerated nearly complete obstructing large mass in the proximal ascending colon & cecum, mass was circumferential. 5mm polyp in sigmoid colon. Pathology of mass showed invasive moderately differentiated adenocarcinoma. Polyp was hyperplastic.  9/27/23: PET CT showed FDG avid cecal wall thickening possibly extending to the parietal peritoneum, additional nonspecific hypermetabolic focus along the cecal wall superolateral to the mass, may be physiologic with an additional site of neoplasm not excluded.  11/13/23: Underwent R colectomy with en bloc abdominal wall resection. Intra-op cecum was densely adherent to the underside of the abdominal wall and lateral aspect of the inguinal canal, there was a palpable tumor at base of the cecum. Appendix was residing within the inguinal canal and scarred inplace due to previous perforation and abscess. Final pathology showed invasive low-grade adenocarcinoma, grade 2 moderately differentiated, no macroscopic tumor perforation, no LVI, no PNI, negative margins 0/23 LNs.     PMH: HLD    SH: , no tobacco, illicits, EtOH    FH: No FH of malignancy    Subjective:   Continues to feel very well, no  symptoms.    No fevers, chills, chest pain, shortness of breath, abdominal pain, nausea, vomiting, diarrhea, or rashes.    ROS as above. Remainder of 10-point review of systems elicited and negative.    Objective:  /79   Pulse 55   Temp 36.3 °C (97.3 °F) (Core)   Resp 18   Wt 61.6 kg (135 lb 12.9 oz)   SpO2 98%   BMI 21.34 kg/m²     PE:  Gen: A&O, NAD  Head: Normocephalic, atraumatic  Eyes: no scleral icterus  ENT: mucous membranes moist, no oropharyngeal lesions  Resp: Lungs CTAB  Cardiac: Normal rate, regular rhythm, no murmurs appreciated  Abdomen: Soft, nondistended, nontender, +BS. Incisions c/d/I, healing well  Neuro: CNII-XII grossly intact  Psych: appropriate mood & affect  Skin: warm, dry, no apparent rashes     ECOG Performance Status: 0      Assessment & Plan:   Martine Cornejo is a 64 y.o. female with Stage IIA cecal cancer that presented 6/2023 after obstructing the appendix causing appendiceal abscess tracking to the R groin.      Colonoscopy 9/11/23 led to the diagnosis of cecal cancer, and she underwent surgical resection on 11/13/23 with Dr. Osuna.     Final pathology showed pT3 pN0 cecal cancer with no high risk features. I discussed with her that while the cecal mass itself was obstructing the appendix leading to intra-abdominal abscess tracking to the groin, this is the only significant high risk feature. I discussed the option of adjuvant chemotherapy and general risks and benefits, and she is motivated for close surveillance over adjuvant therapy. I am fully supportive of this approach.    5/13/24: I personally reviewed the images from the recent CT, which show no evidence of disease. Small thyroid nodule is noted, will order thyroid ultrasound per radiologist recommendations.      8/26/24: Feeling very well. Ultrasound thyroid likely benign, but recommend repeat ultrasound in 1 year--due June 2025. Will fu on Signatera results.     Plan:   Stage IIA Cecal Cancer  - Signatera, CEA, CBC,  CMP q3mo for first 2 years then can space less frequently  - CT 6mo after last CT--due 11/2024  - C-scope 1 year from surgery: due 11/2024  - RTC Nov 2024 with labs & CT    Thyroid nodules  - Repeat thyroid ultrasound 6/2025

## 2024-09-10 DIAGNOSIS — Z12.11 COLON CANCER SCREENING: Primary | ICD-10-CM

## 2024-09-11 RX ORDER — SODIUM, POTASSIUM,MAG SULFATES 17.5-3.13G
SOLUTION, RECONSTITUTED, ORAL ORAL
Qty: 354 ML | Refills: 0 | Status: SHIPPED | OUTPATIENT
Start: 2024-09-11

## 2024-11-13 ENCOUNTER — LAB (OUTPATIENT)
Dept: LAB | Facility: LAB | Age: 64
End: 2024-11-13
Payer: COMMERCIAL

## 2024-11-13 DIAGNOSIS — C18.0 CECAL CANCER (MULTI): ICD-10-CM

## 2024-11-13 LAB
BASOPHILS # BLD AUTO: 0.06 X10*3/UL (ref 0–0.1)
BASOPHILS NFR BLD AUTO: 0.9 %
EOSINOPHIL # BLD AUTO: 0.13 X10*3/UL (ref 0–0.7)
EOSINOPHIL NFR BLD AUTO: 2 %
ERYTHROCYTE [DISTWIDTH] IN BLOOD BY AUTOMATED COUNT: 12.5 % (ref 11.5–14.5)
HCT VFR BLD AUTO: 41.5 % (ref 36–46)
HGB BLD-MCNC: 13.4 G/DL (ref 12–16)
IMM GRANULOCYTES # BLD AUTO: 0.01 X10*3/UL (ref 0–0.7)
IMM GRANULOCYTES NFR BLD AUTO: 0.2 % (ref 0–0.9)
LYMPHOCYTES # BLD AUTO: 1.85 X10*3/UL (ref 1.2–4.8)
LYMPHOCYTES NFR BLD AUTO: 29.1 %
MCH RBC QN AUTO: 30.9 PG (ref 26–34)
MCHC RBC AUTO-ENTMCNC: 32.3 G/DL (ref 32–36)
MCV RBC AUTO: 96 FL (ref 80–100)
MONOCYTES # BLD AUTO: 0.31 X10*3/UL (ref 0.1–1)
MONOCYTES NFR BLD AUTO: 4.9 %
NEUTROPHILS # BLD AUTO: 4 X10*3/UL (ref 1.2–7.7)
NEUTROPHILS NFR BLD AUTO: 62.9 %
NRBC BLD-RTO: 0 /100 WBCS (ref 0–0)
PLATELET # BLD AUTO: 291 X10*3/UL (ref 150–450)
RBC # BLD AUTO: 4.33 X10*6/UL (ref 4–5.2)
WBC # BLD AUTO: 6.4 X10*3/UL (ref 4.4–11.3)

## 2024-11-13 PROCEDURE — 82378 CARCINOEMBRYONIC ANTIGEN: CPT

## 2024-11-13 PROCEDURE — 36415 COLL VENOUS BLD VENIPUNCTURE: CPT

## 2024-11-13 PROCEDURE — 80053 COMPREHEN METABOLIC PANEL: CPT

## 2024-11-13 PROCEDURE — 85025 COMPLETE CBC W/AUTO DIFF WBC: CPT

## 2024-11-14 LAB
ALBUMIN SERPL BCP-MCNC: 4.3 G/DL (ref 3.4–5)
ALP SERPL-CCNC: 92 U/L (ref 33–136)
ALT SERPL W P-5'-P-CCNC: 19 U/L (ref 7–45)
ANION GAP SERPL CALC-SCNC: 11 MMOL/L (ref 10–20)
AST SERPL W P-5'-P-CCNC: 23 U/L (ref 9–39)
BILIRUB SERPL-MCNC: 0.8 MG/DL (ref 0–1.2)
BUN SERPL-MCNC: 18 MG/DL (ref 6–23)
CALCIUM SERPL-MCNC: 10.9 MG/DL (ref 8.6–10.6)
CEA SERPL-MCNC: 2.1 UG/L
CHLORIDE SERPL-SCNC: 104 MMOL/L (ref 98–107)
CO2 SERPL-SCNC: 33 MMOL/L (ref 21–32)
CREAT SERPL-MCNC: 0.81 MG/DL (ref 0.5–1.05)
EGFRCR SERPLBLD CKD-EPI 2021: 81 ML/MIN/1.73M*2
GLUCOSE SERPL-MCNC: 98 MG/DL (ref 74–99)
POTASSIUM SERPL-SCNC: 4.2 MMOL/L (ref 3.5–5.3)
PROT SERPL-MCNC: 7 G/DL (ref 6.4–8.2)
SODIUM SERPL-SCNC: 144 MMOL/L (ref 136–145)

## 2024-11-18 ENCOUNTER — HOSPITAL ENCOUNTER (OUTPATIENT)
Dept: RADIOLOGY | Facility: CLINIC | Age: 64
Discharge: HOME | End: 2024-11-18
Payer: MEDICARE

## 2024-11-18 DIAGNOSIS — C18.0 CECAL CANCER (MULTI): ICD-10-CM

## 2024-11-18 PROCEDURE — 74177 CT ABD & PELVIS W/CONTRAST: CPT

## 2024-11-18 PROCEDURE — 74177 CT ABD & PELVIS W/CONTRAST: CPT | Performed by: RADIOLOGY

## 2024-11-18 PROCEDURE — 2550000001 HC RX 255 CONTRASTS: Performed by: STUDENT IN AN ORGANIZED HEALTH CARE EDUCATION/TRAINING PROGRAM

## 2024-11-18 PROCEDURE — 71260 CT THORAX DX C+: CPT | Performed by: RADIOLOGY

## 2024-11-20 ENCOUNTER — HOSPITAL ENCOUNTER (OUTPATIENT)
Dept: OPERATING ROOM | Facility: CLINIC | Age: 64
Setting detail: OUTPATIENT SURGERY
Discharge: HOME | End: 2024-11-20
Payer: MEDICARE

## 2024-11-20 ENCOUNTER — ANESTHESIA (OUTPATIENT)
Dept: OPERATING ROOM | Facility: CLINIC | Age: 64
End: 2024-11-20
Payer: MEDICARE

## 2024-11-20 ENCOUNTER — ANESTHESIA EVENT (OUTPATIENT)
Dept: OPERATING ROOM | Facility: CLINIC | Age: 64
End: 2024-11-20
Payer: MEDICARE

## 2024-11-20 VITALS
TEMPERATURE: 97.9 F | RESPIRATION RATE: 16 BRPM | SYSTOLIC BLOOD PRESSURE: 125 MMHG | BODY MASS INDEX: 21.25 KG/M2 | HEIGHT: 67 IN | WEIGHT: 135.36 LBS | HEART RATE: 57 BPM | OXYGEN SATURATION: 98 % | DIASTOLIC BLOOD PRESSURE: 79 MMHG

## 2024-11-20 DIAGNOSIS — C18.0 CECAL CANCER (MULTI): Primary | ICD-10-CM

## 2024-11-20 PROCEDURE — 7100000009 HC PHASE TWO TIME - INITIAL BASE CHARGE: Performed by: ANESTHESIOLOGY

## 2024-11-20 PROCEDURE — 3600000002 HC OR TIME - INITIAL BASE CHARGE - PROCEDURE LEVEL TWO: Performed by: ANESTHESIOLOGY

## 2024-11-20 PROCEDURE — G0105 COLORECTAL SCRN; HI RISK IND: HCPCS | Performed by: SURGERY

## 2024-11-20 PROCEDURE — 3700000002 HC GENERAL ANESTHESIA TIME - EACH INCREMENTAL 1 MINUTE: Performed by: ANESTHESIOLOGY

## 2024-11-20 PROCEDURE — A45378 PR COLONOSCOPY,DIAGNOSTIC: Performed by: NURSE ANESTHETIST, CERTIFIED REGISTERED

## 2024-11-20 PROCEDURE — 2500000004 HC RX 250 GENERAL PHARMACY W/ HCPCS (ALT 636 FOR OP/ED): Performed by: NURSE ANESTHETIST, CERTIFIED REGISTERED

## 2024-11-20 PROCEDURE — A45378 PR COLONOSCOPY,DIAGNOSTIC: Performed by: ANESTHESIOLOGY

## 2024-11-20 PROCEDURE — 3600000007 HC OR TIME - EACH INCREMENTAL 1 MINUTE - PROCEDURE LEVEL TWO: Performed by: ANESTHESIOLOGY

## 2024-11-20 PROCEDURE — 7100000010 HC PHASE TWO TIME - EACH INCREMENTAL 1 MINUTE: Performed by: ANESTHESIOLOGY

## 2024-11-20 PROCEDURE — 3700000001 HC GENERAL ANESTHESIA TIME - INITIAL BASE CHARGE: Performed by: ANESTHESIOLOGY

## 2024-11-20 RX ORDER — METOCLOPRAMIDE HYDROCHLORIDE 5 MG/ML
10 INJECTION INTRAMUSCULAR; INTRAVENOUS ONCE AS NEEDED
Status: DISCONTINUED | OUTPATIENT
Start: 2024-11-20 | End: 2024-11-21 | Stop reason: HOSPADM

## 2024-11-20 RX ORDER — ALBUTEROL SULFATE 0.83 MG/ML
2.5 SOLUTION RESPIRATORY (INHALATION) ONCE AS NEEDED
Status: DISCONTINUED | OUTPATIENT
Start: 2024-11-20 | End: 2024-11-21 | Stop reason: HOSPADM

## 2024-11-20 RX ORDER — PROPOFOL 10 MG/ML
INJECTION, EMULSION INTRAVENOUS AS NEEDED
Status: DISCONTINUED | OUTPATIENT
Start: 2024-11-20 | End: 2024-11-20

## 2024-11-20 RX ORDER — LIDOCAINE HYDROCHLORIDE 20 MG/ML
INJECTION, SOLUTION INFILTRATION; PERINEURAL AS NEEDED
Status: DISCONTINUED | OUTPATIENT
Start: 2024-11-20 | End: 2024-11-20

## 2024-11-20 RX ORDER — LIDOCAINE HYDROCHLORIDE 10 MG/ML
0.1 INJECTION, SOLUTION EPIDURAL; INFILTRATION; INTRACAUDAL; PERINEURAL ONCE
Status: DISCONTINUED | OUTPATIENT
Start: 2024-11-20 | End: 2024-11-21 | Stop reason: HOSPADM

## 2024-11-20 RX ORDER — ONDANSETRON HYDROCHLORIDE 2 MG/ML
4 INJECTION, SOLUTION INTRAVENOUS ONCE AS NEEDED
Status: DISCONTINUED | OUTPATIENT
Start: 2024-11-20 | End: 2024-11-21 | Stop reason: HOSPADM

## 2024-11-20 SDOH — HEALTH STABILITY: MENTAL HEALTH: CURRENT SMOKER: 0

## 2024-11-20 ASSESSMENT — PAIN SCALES - GENERAL
PAINLEVEL_OUTOF10: 0 - NO PAIN
PAIN_LEVEL: 0
PAINLEVEL_OUTOF10: 0 - NO PAIN

## 2024-11-20 ASSESSMENT — ENCOUNTER SYMPTOMS
CARDIOVASCULAR NEGATIVE: 1
RESPIRATORY NEGATIVE: 1
ENDOCRINE NEGATIVE: 1
GASTROINTESTINAL NEGATIVE: 1
CONSTITUTIONAL NEGATIVE: 1
EYES NEGATIVE: 1

## 2024-11-20 ASSESSMENT — PAIN - FUNCTIONAL ASSESSMENT
PAIN_FUNCTIONAL_ASSESSMENT: 0-10
PAIN_FUNCTIONAL_ASSESSMENT: 0-10

## 2024-11-20 ASSESSMENT — COLUMBIA-SUICIDE SEVERITY RATING SCALE - C-SSRS
2. HAVE YOU ACTUALLY HAD ANY THOUGHTS OF KILLING YOURSELF?: NO
1. IN THE PAST MONTH, HAVE YOU WISHED YOU WERE DEAD OR WISHED YOU COULD GO TO SLEEP AND NOT WAKE UP?: NO
6. HAVE YOU EVER DONE ANYTHING, STARTED TO DO ANYTHING, OR PREPARED TO DO ANYTHING TO END YOUR LIFE?: NO

## 2024-11-20 NOTE — H&P
History Of Present Illness  Martine Cornejo is a 64 y.o. female presenting with surveillance for known cecal cancer which was resected 11/13/2023.    Colonoscopy 09/11/2023:  Hemorrhoids were found on perianal exam.       Skin tags were found on perianal exam.       An ulcerated nearly complete obstructing large mass was found in the        region of the proximal ascending colon and cecum. The mass was        circumferential. Oozing was present. Biopsies were taken with a cold        forceps for histology (jar A). Verification of patient identification        for the specimen was done by the nurse and technician using the        patient's name, birth date and medical record number. Estimated blood        loss was minimal.       A 5 mm polyp was found in the sigmoid colon. The polyp was flat and        Margaret classification IIb (flat). The polyp was removed with a cold        snare. Resection and retrieval were complete (jar B). Verification of        patient identification for the specimen was done by the nurse and        technician using the patient's name, birth date and medical record        number. Estimated blood loss was minimal.       Non-bleeding external hemorrhoids were found during retroflexion. The        hemorrhoids were small.    Laparatomy, right colectomy en bloc abdominal wall resection on 11/13/2023.  This resected specimen was sent to pathology for frozen section examination. It returned as showing lymphoid tissue with no evidence of invasive cancer.     A. SOFT TISSUE RESECTION: PORTIONS OF LYMPHOID TISSUE AND SOFT TISSUE WITH NO SIGNIFICANT PATHOLOGIC FINDINGS.     B. COLON - RIGHT HEMICOLECTOMY: INVASIVE LOW-GRADE ADENOCARCINOMA, SEE SYNOPTIC REPORT AND NOTE.     Note: Immunohistochemistry for mismatch repair proteins has been ordered and this report will be issued in the form of an addendum.     A portion of skin with no significant pathologic finding is noted in the specimen.b     NO EVIDENCE OF  NEOPLASIA IDENTIFIED IN 23 SUBMITTED LYMPH NODES.     C. TERMINAL ILEUM WITH RIGHT HEMICOLECTOMY: PORTION OF SMALL INTESTINE WITH NO SIGNIFICANT PATHOLOGIC FINDINGS.    CT 08/26/2024:  IMPRESSION:  CHEST:  1. No evidence of chest metastasis. No change from previous  examination of 05/08/2024.  2. Right thyroid nodule unchanged. Ultrasound examination has been  performed on 06/13/2024.  3. Ectasia of the ascending aorta. Follow-up recommended.  4. Left atrial enlargement.      ABDOMEN-PELVIS:  1. Status post partial colectomy right colon. No recurrent mass or  evidence of abdominal metastasis.  2. Left ovarian dermoid unchanged.     Past Medical History  Past Medical History:   Diagnosis Date    Adenocarcinoma of colon (Multi)     scheduled for surgery with Dr. Osuna on 11/13/2023    Chicken pox     Groin mass     Hyperlipidemia     Mononucleosis     Vision loss      Surgical History  Past Surgical History:   Procedure Laterality Date    COLONOSCOPY       Social History  She reports that she has never smoked. She has never used smokeless tobacco. She reports current alcohol use of about 2.0 standard drinks of alcohol per week. She reports that she does not use drugs.    Family History  Family History   Problem Relation Name Age of Onset    Coronary artery disease Father          Allergies  Allergies   Allergen Reactions    Keflex [Cephalexin] Rash    Penicillins Rash     Review of Systems   Constitutional: Negative.    HENT: Negative.     Eyes: Negative.    Respiratory: Negative.     Cardiovascular: Negative.    Gastrointestinal: Negative.    Endocrine: Negative.    Genitourinary: Negative.         Physical Exam  Constitutional:       General: She is awake.      Appearance: Normal appearance.   HENT:      Head: Normocephalic and atraumatic.      Nose: Nose normal.      Mouth/Throat:      Mouth: Mucous membranes are moist.   Eyes:      Pupils: Pupils are equal, round, and reactive to light.   Neck:      Thyroid: No  "thyroid mass.      Trachea: Phonation normal.   Cardiovascular:      Rate and Rhythm: Normal rate and regular rhythm.      Heart sounds: Normal heart sounds. No murmur heard.     No gallop.   Pulmonary:      Effort: Pulmonary effort is normal. No respiratory distress.      Breath sounds: Normal air entry. No decreased breath sounds, wheezing, rhonchi or rales.   Abdominal:      General: Bowel sounds are normal. There is no distension.      Palpations: Abdomen is soft.      Tenderness: There is no abdominal tenderness.   Musculoskeletal:      Cervical back: Neck supple.      Right lower leg: No edema.      Left lower leg: No edema.   Skin:     General: Skin is warm.      Capillary Refill: Capillary refill takes less than 2 seconds.   Neurological:      General: No focal deficit present.      Mental Status: She is alert and oriented to person, place, and time. Mental status is at baseline.      Cranial Nerves: Cranial nerves 2-12 are intact.      Motor: Motor function is intact.   Psychiatric:         Attention and Perception: Attention and perception normal.         Mood and Affect: Mood normal.         Speech: Speech normal.         Behavior: Behavior normal.          Last Recorded Vitals  Blood pressure 137/80, pulse 59, temperature 36 °C (96.8 °F), temperature source Temporal, resp. rate 16, height 1.702 m (5' 7\"), weight 61.4 kg (135 lb 5.8 oz), SpO2 95%.    Assessment/Plan   Consented for surveillance colonoscopy.    Anticipate repeat in 5 years. Cancer history.     Morro Falcon MD PhD  "

## 2024-11-20 NOTE — ANESTHESIA POSTPROCEDURE EVALUATION
Patient: Martine Cornejo    Procedure Summary       Date: 11/20/24 Room / Location: [facility] OR    Anesthesia Start: 1115 Anesthesia Stop: 1141    Procedure: COLONOSCOPY Diagnosis: Cecal cancer (Multi)    Scheduled Providers: Morro Falcon MD PhD Responsible Provider: Tommy Peña MD    Anesthesia Type: MAC ASA Status: 3            Anesthesia Type: MAC    Vitals Value Taken Time   /70 11/20/24 1140   Temp 36.8 °C (98.2 °F) 11/20/24 1140   Pulse 60 11/20/24 1140   Resp 16 11/20/24 1140   SpO2 98 % 11/20/24 1140       Anesthesia Post Evaluation    Patient location during evaluation: PACU  Patient participation: complete - patient participated  Level of consciousness: awake  Pain score: 0  Pain management: adequate  Airway patency: patent  Cardiovascular status: acceptable  Respiratory status: acceptable  Hydration status: acceptable  Postoperative Nausea and Vomiting: none        There were no known notable events for this encounter.

## 2024-11-20 NOTE — DISCHARGE INSTRUCTIONS
During the first 24 hours after your procedure, you should:    - Resume normal diet, unless otherwise directed by your doctor.  - Resume your home medications, unless otherwise directed by your doctor.  - Refrain from driving or operative heavy machinery.  - Drink plenty of liquids.  - Avoid consuming alcohol.  - Avoid strenuous activity or heavy lifting.    After 24 hours, you can resume regular activity.    Call your doctor office immediately ([phone]) or come to the nearest emergency room if you experience:    - Abdominal tenderness  - Blood in your stool or vomit  - Difficulty urinating or passing stools  - Difficulty breathing  - Chest pain  - Fever       If you experience any problems or have any questions following discharge from the GI Lab, please call:   Dr. Falcon at [phone].   To reach your physician after hours call [phone] and ask for the GI physician on call.

## 2024-11-20 NOTE — ANESTHESIA PREPROCEDURE EVALUATION
Patient: Martine Cornejo    Procedure Information       Date/Time: 11/20/24 1130    Scheduled providers: Morro Falcon MD PhD    Procedure: COLONOSCOPY    Location: [facility] OR            Relevant Problems   GI   (+) Adenocarcinoma of colon (Multi)   (+) Cecal cancer (Multi)   (+) Colon cancer, ascending (Multi)   (+) Malignant neoplasm of ascending colon (Multi)   (+) Malignant neoplasm of right colon (Multi)      Liver   (+) Adenocarcinoma of colon (Multi)   (+) Cecal cancer (Multi)   (+) Colon cancer, ascending (Multi)   (+) Malignant neoplasm of ascending colon (Multi)   (+) Malignant neoplasm of right colon (Multi)       Clinical information reviewed:   Tobacco  Allergies  Meds  Problems  Med Hx  Surg Hx   Fam Hx  Soc   Hx        NPO Detail:  NPO/Void Status  Date of Last Liquid: 11/19/24  Time of Last Liquid: 0800  Date of Last Solid: 11/19/24  Time of Last Solid: 0700  Last Intake Type: Light meal; Clear fluids  Time of Last Void: 0959         PHYSICAL EXAM    Anesthesia Plan    History of general anesthesia?: yes  History of complications of general anesthesia?: no    ASA 3     MAC     The patient is not a current smoker.    intravenous induction   Anesthetic plan and risks discussed with patient.

## 2024-12-02 NOTE — PROGRESS NOTES
CHIOMA Cornejo is a 64 y.o. female with a history of cecal cancer s/p ex-lap, right colectomy, en bloc abdominal wall resection with Dr. Sven Osuna on 11/13/23. Pathology demonstrated pT3N0. MMR intact. Feeling well. Good energy, able to eat without any issue. Moving bowels normally.     CEA 11/2023: 1.8; 11/2024: 2.1    CT c/a/p 11/2024:   CHEST:  1. No evidence of chest metastasis. No change from previous  examination of 05/08/2024.  2. Right thyroid nodule unchanged. Ultrasound examination has been performed on 06/13/2024.  3. Ectasia of the ascending aorta. Follow-up recommended.  4. Left atrial enlargement.  ABDOMEN-PELVIS:  1. Status post partial colectomy right colon. No recurrent mass or evidence of abdominal metastasis.  2. Left ovarian dermoid unchanged.    Colonoscopy 11/20/2024 ([facility]): The ascending colon, hepatic flexure, transverse colon, splenic flexure, descending colon, sigmoid colon, rectosigmoid, rectum, site of the anastomosis and anal region appeared normal. Repeat in 5 years.     Non-smoker/No ETOH/No Illicit drug use  PMH:   PSH:  No family history of CRC or IBD  Employment:     Past Medical History:   Diagnosis Date    Adenocarcinoma of colon (Multi)     scheduled for surgery with Dr. Osuna on 11/13/2023    Chicken pox     Groin mass     Hyperlipidemia     Mononucleosis     Vision loss        Past Surgical History:   Procedure Laterality Date    COLONOSCOPY         Allergies   Allergen Reactions    Keflex [Cephalexin] Rash    Penicillins Rash       Review of Systems    Physical Exam  NAD  Abd soft NT/ND  Incision well healed  No hernia    Assessment and Plan:   Doing well. Up to date on surveillance   Follow up 6 months

## 2024-12-03 ENCOUNTER — OFFICE VISIT (OUTPATIENT)
Dept: SURGERY | Facility: CLINIC | Age: 64
End: 2024-12-03
Payer: MEDICARE

## 2024-12-03 VITALS
BODY MASS INDEX: 22.08 KG/M2 | WEIGHT: 141 LBS | DIASTOLIC BLOOD PRESSURE: 76 MMHG | HEART RATE: 68 BPM | SYSTOLIC BLOOD PRESSURE: 117 MMHG

## 2024-12-03 DIAGNOSIS — C18.9 ADENOCARCINOMA OF COLON (MULTI): Primary | ICD-10-CM

## 2024-12-03 PROCEDURE — 99213 OFFICE O/P EST LOW 20 MIN: CPT | Performed by: SURGERY

## 2024-12-03 PROCEDURE — 1159F MED LIST DOCD IN RCRD: CPT | Performed by: SURGERY

## 2024-12-09 ENCOUNTER — APPOINTMENT (OUTPATIENT)
Dept: HEMATOLOGY/ONCOLOGY | Facility: HOSPITAL | Age: 64
End: 2024-12-09
Payer: MEDICARE

## 2024-12-09 ENCOUNTER — TELEPHONE (OUTPATIENT)
Dept: ADMISSION | Facility: HOSPITAL | Age: 64
End: 2024-12-09
Payer: MEDICARE

## 2024-12-09 ENCOUNTER — TELEMEDICINE (OUTPATIENT)
Dept: HEMATOLOGY/ONCOLOGY | Facility: HOSPITAL | Age: 64
End: 2024-12-09
Payer: MEDICARE

## 2024-12-09 DIAGNOSIS — C18.0 CECAL CANCER (MULTI): ICD-10-CM

## 2024-12-09 PROCEDURE — 99215 OFFICE O/P EST HI 40 MIN: CPT | Performed by: STUDENT IN AN ORGANIZED HEALTH CARE EDUCATION/TRAINING PROGRAM

## 2024-12-09 PROCEDURE — G2211 COMPLEX E/M VISIT ADD ON: HCPCS | Performed by: STUDENT IN AN ORGANIZED HEALTH CARE EDUCATION/TRAINING PROGRAM

## 2024-12-09 NOTE — PROGRESS NOTES
Cancer History:  DIAGNOSIS: Cecal cancer    STAGING: pT3 pN0 Mx    CURRENT SITES OF DISEASE: None    MOLECULAR/GENOMIC:  MMR-intact    TUMOR MARKER:   CEA 11/2023 1.8    CURRENT TREATMENT:       PRIOR TREATMENT:   11/13/23: S/p R colectomy with en bloc abdominal wall resection    HISTORY:   6/2023: Presented with R groin mass. Found to have abscess in the groin. Ultrasound should complex solid & cystic mass with channel draining to the surface, recommend CT.  7/28/23: CT showed suspicious masslike thickening of the cecum and appendix for malignancy, which extends into the R inguinal canal. Nonspecific inguinal and R lower quadrant mesenteric lymphadenopathy and/or adjuacent soft tissue metastatic deposits. Nonspecific 8mm hepatic hypodensity.   9/11/23: C-scope showed ulcerated nearly complete obstructing large mass in the proximal ascending colon & cecum, mass was circumferential. 5mm polyp in sigmoid colon. Pathology of mass showed invasive moderately differentiated adenocarcinoma. Polyp was hyperplastic.  9/27/23: PET CT showed FDG avid cecal wall thickening possibly extending to the parietal peritoneum, additional nonspecific hypermetabolic focus along the cecal wall superolateral to the mass, may be physiologic with an additional site of neoplasm not excluded.  11/13/23: Underwent R colectomy with en bloc abdominal wall resection. Intra-op cecum was densely adherent to the underside of the abdominal wall and lateral aspect of the inguinal canal, there was a palpable tumor at base of the cecum. Appendix was residing within the inguinal canal and scarred inplace due to previous perforation and abscess. Final pathology showed invasive low-grade adenocarcinoma, grade 2 moderately differentiated, no macroscopic tumor perforation, no LVI, no PNI, negative margins 0/23 LNs.     PMH: HLD    SH: , no tobacco, illicits, EtOH    FH: No FH of malignancy    Subjective:   Continues to feel very well, no symptoms.  Recently saw Dr. Zamora & recently underwent colonoscopy.     No fevers, chills, chest pain, shortness of breath, abdominal pain, nausea, vomiting, diarrhea, or rashes.    ROS as above. Remainder of 10-point review of systems elicited and negative.    Objective:  There were no vitals taken for this visit.    PE:  Gen: A&O, NAD  Head: Normocephalic, atraumatic  Eyes: no scleral icterus  ENT: mucous membranes moist, no oropharyngeal lesions  Resp: No respiratory distress  Neuro: CNII-XII grossly intact  Psych: appropriate mood & affect  Skin: warm, dry, no apparent rashes     ECOG Performance Status: 0    Colonoscopy 11/20/24:  Impression  The ascending colon, hepatic flexure, transverse colon, splenic flexure, descending colon, sigmoid colon, rectosigmoid, rectum, site of the anastomosis and anal region appeared normal.  Findings  The ascending colon, hepatic flexure, transverse colon, splenic flexure, descending colon, sigmoid colon, rectosigmoid, rectum, site of the anastomosis and anal region appeared normal. Prior ileocecectomy.  Recommendation  Repeat colonoscopy in 5 years, due: 11/19/2029       CT C/A/P 11/18/24:   CHEST:  1. No evidence of chest metastasis. No change from previous  examination of 05/08/2024.  2. Right thyroid nodule unchanged. Ultrasound examination has been  performed on 06/13/2024.  3. Ectasia of the ascending aorta. Follow-up recommended.  4. Left atrial enlargement.      ABDOMEN-PELVIS:  1. Status post partial colectomy right colon. No recurrent mass or  evidence of abdominal metastasis.  2. Left ovarian dermoid unchanged.    Assessment & Plan:   Martine Cornejo is a 64 y.o. female with Stage IIA cecal cancer that presented 6/2023 after obstructing the appendix causing appendiceal abscess tracking to the R groin.      Colonoscopy 9/11/23 led to the diagnosis of cecal cancer, and she underwent surgical resection on 11/13/23 with Dr. Osuna.     Final pathology showed pT3 pN0 cecal cancer  with no high risk features. I discussed with her that while the cecal mass itself was obstructing the appendix leading to intra-abdominal abscess tracking to the groin, this is the only significant high risk feature. I discussed the option of adjuvant chemotherapy and general risks and benefits, and she is motivated for close surveillance over adjuvant therapy. I am fully supportive of this approach.    5/13/24: I personally reviewed the images from the recent CT, which show no evidence of disease. Small thyroid nodule is noted, will order thyroid ultrasound per radiologist recommendations.      8/26/24: Feeling very well. Ultrasound thyroid likely benign, but recommend repeat ultrasound in 1 year--due June 2025. Will fu on Signatera results.     12/9/24: I personally reviewed the images from the recent CT, which show no evidence of disease. C-scope clear, recommend repeat in 11/2029. CEA negative, Signatera negative. RTC 3 mo with labs; repeat CT 1 year--due 11/2025    Plan:   Stage IIA Cecal Cancer  - Signatera, CEA, CBC, CMP q3mo for first 2 years then can space less frequently  - CT 1y due 11/2025  - C-scope due 11/2029  - RTC 3mo with CBC, CEA, CMP, and Signatera    Thyroid nodules  - Repeat thyroid ultrasound 6/2025

## 2024-12-09 NOTE — TELEPHONE ENCOUNTER
Pt called asking if her follow up appointment today can be changed to a virtual visit.  Appt moved to 4:20pm today as a virtual per [facility].t message response from RN partner offering this appt slot.

## 2025-02-23 DIAGNOSIS — C18.2 MALIGNANT NEOPLASM OF RIGHT COLON (MULTI): Primary | ICD-10-CM

## 2025-02-24 ENCOUNTER — TELEMEDICINE (OUTPATIENT)
Dept: HEMATOLOGY/ONCOLOGY | Facility: HOSPITAL | Age: 65
End: 2025-02-24
Payer: MEDICARE

## 2025-02-24 ENCOUNTER — TELEPHONE (OUTPATIENT)
Dept: HEMATOLOGY/ONCOLOGY | Facility: HOSPITAL | Age: 65
End: 2025-02-24
Payer: MEDICARE

## 2025-02-24 DIAGNOSIS — Z08 ENCOUNTER FOR FOLLOW-UP SURVEILLANCE OF COLON CANCER: ICD-10-CM

## 2025-02-24 DIAGNOSIS — C18.2 MALIGNANT NEOPLASM OF RIGHT COLON (MULTI): ICD-10-CM

## 2025-02-24 DIAGNOSIS — Z85.038 ENCOUNTER FOR FOLLOW-UP SURVEILLANCE OF COLON CANCER: ICD-10-CM

## 2025-02-24 DIAGNOSIS — C18.0 CECAL CANCER (MULTI): Primary | ICD-10-CM

## 2025-02-24 PROCEDURE — 99213 OFFICE O/P EST LOW 20 MIN: CPT | Performed by: STUDENT IN AN ORGANIZED HEALTH CARE EDUCATION/TRAINING PROGRAM

## 2025-02-24 NOTE — PROGRESS NOTES
Martine was seen today for a virtual visit. Signatera is ordered; order has been entered in [facility] portal for kit to be shipped ASA. Patient is aware.

## 2025-02-24 NOTE — TELEPHONE ENCOUNTER
Called Martine to advise that [facility] is running behind and will send a new link for her virtual visit to let her know when she is ready. Martine stated that she has to  kids between 5:00 and 5:30; she requests to start her visit around 5:30. Secure chat sent to [facility] to notify.

## 2025-02-25 DIAGNOSIS — C18.2 MALIGNANT NEOPLASM OF RIGHT COLON (MULTI): ICD-10-CM

## 2025-03-07 NOTE — PROGRESS NOTES
Verbal consent was requested and obtained from patient on this date for a video/telehealth visit.     Cancer History:  DIAGNOSIS: Cecal cancer    STAGING: pT3 pN0 Mx    CURRENT SITES OF DISEASE: None    MOLECULAR/GENOMIC:  MMR-intact    ctDNA Signatera:  12/11/23: undetected (0)   2/5/24: undetected (0)   5/13/24: undetected (0)   8/26/24: undetected (0)     TUMOR MARKER: CEA  11/2023 1.8  12/11/23: 1.6  5/6/24: 2.2  5/13/24: 2.0  8/26/24: 1.8  11/13/24: 2.1    CURRENT TREATMENT:   Surveillance     PRIOR TREATMENT:   11/13/23: S/p R colectomy with en bloc abdominal wall resection    HISTORY:   6/2023: Presented with R groin mass. Found to have abscess in the groin. Ultrasound should complex solid & cystic mass with channel draining to the surface, recommend CT.  7/28/23: CT showed suspicious masslike thickening of the cecum and appendix for malignancy, which extends into the R inguinal canal. Nonspecific inguinal and R lower quadrant mesenteric lymphadenopathy and/or adjuacent soft tissue metastatic deposits. Nonspecific 8mm hepatic hypodensity.   9/11/23: C-scope showed ulcerated nearly complete obstructing large mass in the proximal ascending colon & cecum, mass was circumferential. 5mm polyp in sigmoid colon. Pathology of mass showed invasive moderately differentiated adenocarcinoma. Polyp was hyperplastic.  9/27/23: PET CT showed FDG avid cecal wall thickening possibly extending to the parietal peritoneum, additional nonspecific hypermetabolic focus along the cecal wall superolateral to the mass, may be physiologic with an additional site of neoplasm not excluded.  11/13/23: Underwent R colectomy with en bloc abdominal wall resection. Intra-op cecum was densely adherent to the underside of the abdominal wall and lateral aspect of the inguinal canal, there was a palpable tumor at base of the cecum. Appendix was residing within the inguinal canal and scarred inplace due to previous perforation and abscess. Final  pathology showed invasive low-grade adenocarcinoma, grade 2 moderately differentiated, no macroscopic tumor perforation, no LVI, no PNI, negative margins 0/23 LNs.     PMH: HLD    SH: , no tobacco, illicits, EtOH    FH: No FH of malignancy    Subjective:   Continues to feel very well, no symptoms. Wondered if she needed labs drawn with Signatera       No fevers, chills, chest pain, shortness of breath, abdominal pain, nausea, vomiting, diarrhea, or rashes.    ROS as above. Remainder of 10-point review of systems elicited and negative.    Objective:  There were no vitals taken for this visit.    PE:  Deferred due to phone visit    ECOG Performance Status: 0    Colonoscopy 11/20/24:  Impression  The ascending colon, hepatic flexure, transverse colon, splenic flexure, descending colon, sigmoid colon, rectosigmoid, rectum, site of the anastomosis and anal region appeared normal.  Findings  The ascending colon, hepatic flexure, transverse colon, splenic flexure, descending colon, sigmoid colon, rectosigmoid, rectum, site of the anastomosis and anal region appeared normal. Prior ileocecectomy.  Recommendation  Repeat colonoscopy in 5 years, due: 11/19/2029       CT C/A/P 11/18/24:   CHEST:  1. No evidence of chest metastasis. No change from previous  examination of 05/08/2024.  2. Right thyroid nodule unchanged. Ultrasound examination has been  performed on 06/13/2024.  3. Ectasia of the ascending aorta. Follow-up recommended.  4. Left atrial enlargement.      ABDOMEN-PELVIS:  1. Status post partial colectomy right colon. No recurrent mass or  evidence of abdominal metastasis.  2. Left ovarian dermoid unchanged.    Assessment & Plan:   Martine Cornejo is a 64 y.o. female with Stage IIA cecal cancer that presented 6/2023 after obstructing the appendix causing appendiceal abscess tracking to the R groin.      Colonoscopy 9/11/23 led to the diagnosis of cecal cancer, and she underwent surgical resection on 11/13/23 with  Dr. Osuna.     Final pathology showed pT3 pN0 cecal cancer with no high risk features. I discussed with her that while the cecal mass itself was obstructing the appendix leading to intra-abdominal abscess tracking to the groin, this is the only significant high risk feature. I discussed the option of adjuvant chemotherapy and general risks and benefits, and she is motivated for close surveillance over adjuvant therapy. I am fully supportive of this approach.    5/13/24: I personally reviewed the images from the recent CT, which show no evidence of disease. Small thyroid nodule is noted, will order thyroid ultrasound per radiologist recommendations.      8/26/24: Feeling very well. Ultrasound thyroid likely benign, but recommend repeat ultrasound in 1 year--due June 2025. Will fu on Signatera results.     12/9/24: I personally reviewed the images from the recent CT, which show no evidence of disease. C-scope clear, recommend repeat in 11/2029. CEA negative, Signatera negative. RTC 3 mo with labs; repeat CT 1 year--due 11/2025    Plan:   Stage IIA Cecal Cancer  - Signatera, CEA, CBC, CMP q3mo for first 2 years then can space less frequently  - CT 1y due 11/2025  - C-scope due 11/2029  - didn't have labs done for 2/24/25 visit. Will reach out to [facility] to have box sent to her home then she will go get all lab work (CBC, CEA, CMP, and Signatera)  - RTC 3mo with Dr. Sánchez since Dr. Kruse is no longer at . Will also have CBC, CEA, CMP, and Signatera    Thyroid nodules  - Repeat thyroid ultrasound 6/2025

## 2025-03-19 ENCOUNTER — TELEPHONE (OUTPATIENT)
Dept: ADMISSION | Facility: HOSPITAL | Age: 65
End: 2025-03-19
Payer: MEDICARE

## 2025-03-19 NOTE — TELEPHONE ENCOUNTER
The patient received her Signetera results via her online portal.   Unsure what the mean and if they are significant. Would like a call back from someone in the office to discuss. Her callback number is [phone]

## 2025-05-14 ENCOUNTER — TELEPHONE (OUTPATIENT)
Dept: HEMATOLOGY/ONCOLOGY | Facility: HOSPITAL | Age: 65
End: 2025-05-14
Payer: MEDICARE

## 2025-05-14 NOTE — TELEPHONE ENCOUNTER
Appt was changed to virtual.    Call to patient who was able to teach back the appt will be virtual.    She states no further questions

## 2025-05-14 NOTE — TELEPHONE ENCOUNTER
Patient has an appt with Lisa on 5/30 and is wondering if that could be changed to a virtual?    She is asking due to the distance she lives from the facility.      Please advise    Message sent

## 2025-05-15 PROCEDURE — 93306 TTE W/DOPPLER COMPLETE: CPT | Performed by: INTERNAL MEDICINE

## 2025-05-21 ENCOUNTER — LAB (OUTPATIENT)
Dept: LAB | Facility: HOSPITAL | Age: 65
End: 2025-05-21
Payer: MEDICARE

## 2025-05-21 DIAGNOSIS — C18.2 MALIGNANT NEOPLASM OF RIGHT COLON (MULTI): ICD-10-CM

## 2025-05-21 DIAGNOSIS — C18.0 CECAL CANCER (MULTI): ICD-10-CM

## 2025-05-30 ENCOUNTER — TELEMEDICINE (OUTPATIENT)
Dept: HEMATOLOGY/ONCOLOGY | Facility: CLINIC | Age: 65
End: 2025-05-30
Payer: MEDICARE

## 2025-05-30 ENCOUNTER — APPOINTMENT (OUTPATIENT)
Dept: HEMATOLOGY/ONCOLOGY | Facility: CLINIC | Age: 65
End: 2025-05-30
Payer: MEDICARE

## 2025-05-30 DIAGNOSIS — C18.0 CECAL CANCER (MULTI): ICD-10-CM

## 2025-05-30 DIAGNOSIS — C18.9 ADENOCARCINOMA OF COLON: Primary | ICD-10-CM

## 2025-05-30 DIAGNOSIS — C18.2 MALIGNANT NEOPLASM OF RIGHT COLON (MULTI): ICD-10-CM

## 2025-05-30 PROCEDURE — 1159F MED LIST DOCD IN RCRD: CPT

## 2025-05-30 PROCEDURE — 99214 OFFICE O/P EST MOD 30 MIN: CPT

## 2025-05-30 PROCEDURE — 1160F RVW MEDS BY RX/DR IN RCRD: CPT

## 2025-05-31 NOTE — PROGRESS NOTES
Virtual or Telephone Consent    An interactive audio and video telecommunication system which permits real time communications between the patient (at the originating site) and provider (at the distant site) was utilized to provide this telehealth service.   Verbal consent was requested and obtained from Martine Cornejo on this date, 05/30/25 for a telehealth visit and the patient's location was confirmed at the time of the visit.     Cancer History:  DIAGNOSIS: Cecal cancer    STAGING: pT3 pN0 Mx    CURRENT SITES OF DISEASE: None    MOLECULAR/GENOMIC:  MMR-intact    ctDNA Signatera:  12/11/23: undetected (0)   2/5/24: undetected (0)   5/13/24: undetected (0)   8/26/24: undetected (0)   3/5/25: undetected (0)  5/20/25: undetected (0)     TUMOR MARKER: CEA  11/2023 1.8  12/11/23: 1.6  5/6/24: 2.2  5/13/24: 2.0  8/26/24: 1.8  11/13/24: 2.1      CURRENT TREATMENT:   Surveillance     PRIOR TREATMENT:   11/13/23: S/p R colectomy with en bloc abdominal wall resection    HISTORY:   6/2023: Presented with R groin mass. Found to have abscess in the groin. Ultrasound should complex solid & cystic mass with channel draining to the surface, recommend CT.  7/28/23: CT showed suspicious masslike thickening of the cecum and appendix for malignancy, which extends into the R inguinal canal. Nonspecific inguinal and R lower quadrant mesenteric lymphadenopathy and/or adjuacent soft tissue metastatic deposits. Nonspecific 8mm hepatic hypodensity.   9/11/23: C-scope showed ulcerated nearly complete obstructing large mass in the proximal ascending colon & cecum, mass was circumferential. 5mm polyp in sigmoid colon. Pathology of mass showed invasive moderately differentiated adenocarcinoma. Polyp was hyperplastic.  9/27/23: PET CT showed FDG avid cecal wall thickening possibly extending to the parietal peritoneum, additional nonspecific hypermetabolic focus along the cecal wall superolateral to the mass, may be physiologic with an additional  site of neoplasm not excluded.  11/13/23: Underwent R colectomy with en bloc abdominal wall resection. Intra-op cecum was densely adherent to the underside of the abdominal wall and lateral aspect of the inguinal canal, there was a palpable tumor at base of the cecum. Appendix was residing within the inguinal canal and scarred inplace due to previous perforation and abscess. Final pathology showed invasive low-grade adenocarcinoma, grade 2 moderately differentiated, no macroscopic tumor perforation, no LVI, no PNI, negative margins 0/23 LNs.     PMH: HLD    SH: , no tobacco, illicits, EtOH    FH: No FH of malignancy    Subjective:   Martine is on active surveillance monitoring. She continues to feel very well. She reports her energy level as great as she is active outside riding bikes and maintaining an active life style. Appetite is decent and weight is stable. She denies any fevers, chills, chest pain, shortness of breath, abdominal pain, nausea, vomiting, diarrhea, or rashes.    ROS as above. Remainder of 10-point review of systems elicited and negative.    Objective:  There were no vitals taken for this visit.    The following was observed via video telehealth:  PE:  General: Alert and oriented, no signs of acute distress  Head: Normocephalic, atraumatic   Psych: appropriate mood & affect       ECOG Performance Status: 0    Colonoscopy 11/20/24:  Impression  The ascending colon, hepatic flexure, transverse colon, splenic flexure, descending colon, sigmoid colon, rectosigmoid, rectum, site of the anastomosis and anal region appeared normal.  Findings  The ascending colon, hepatic flexure, transverse colon, splenic flexure, descending colon, sigmoid colon, rectosigmoid, rectum, site of the anastomosis and anal region appeared normal. Prior ileocecectomy.  Recommendation  Repeat colonoscopy in 5 years, due: 11/19/2029       CT C/A/P 11/18/24:   CHEST:  1. No evidence of chest metastasis. No change from  previous  examination of 05/08/2024.  2. Right thyroid nodule unchanged. Ultrasound examination has been  performed on 06/13/2024.  3. Ectasia of the ascending aorta. Follow-up recommended.  4. Left atrial enlargement.      ABDOMEN-PELVIS:  1. Status post partial colectomy right colon. No recurrent mass or  evidence of abdominal metastasis.  2. Left ovarian dermoid unchanged.    Assessment & Plan:   Martine Cornejo is a 64 y.o. female with Stage IIA cecal cancer that presented 6/2023 after obstructing the appendix causing appendiceal abscess tracking to the R groin.      Colonoscopy 9/11/23 led to the diagnosis of cecal cancer, and she underwent surgical resection on 11/13/23 with Dr. Osuna.     Final pathology showed pT3 pN0 cecal cancer with no high risk features. Dr. Kruse previously discussed with her that while the cecal mass itself was obstructing the appendix leading to intra-abdominal abscess tracking to the groin, this is the only significant high risk feature. She also discussed the option of adjuvant chemotherapy and general risks and benefits, and patient was motivated for close surveillance over adjuvant therapy.     Plan:   Stage IIA Cecal Cancer  - Signatera, CEA, CBC, CMP q3mo for first 2 years then can space less frequently  - CT CAP 11/2024: No signs of recurrence   - C-scope done 11/2024 and due next 11/2029  - 5/2025: Signatera remains negative   - Will repeat CT CAP now- If stable will plan to repeat labs again in 3 months and see in 6 months for exam.   - RTC post CT CAP completion

## 2025-06-13 ENCOUNTER — TELEPHONE (OUTPATIENT)
Dept: HEMATOLOGY/ONCOLOGY | Facility: HOSPITAL | Age: 65
End: 2025-06-13
Payer: MEDICARE

## 2025-06-13 DIAGNOSIS — C18.9 ADENOCARCINOMA OF COLON: Primary | ICD-10-CM

## 2025-06-13 DIAGNOSIS — C18.2 MALIGNANT NEOPLASM OF RIGHT COLON (MULTI): ICD-10-CM

## 2025-06-13 NOTE — TELEPHONE ENCOUNTER
RN called Angel back and he asked if Martine's BMP can be changed to STAT order, so the results will be back in time for her to get her CT scan on Monday 6/16. RN will reach out to provider.

## 2025-06-14 DIAGNOSIS — C18.9 ADENOCARCINOMA OF COLON: ICD-10-CM

## 2025-06-16 ENCOUNTER — LAB (OUTPATIENT)
Dept: LAB | Facility: HOSPITAL | Age: 65
End: 2025-06-16
Payer: MEDICARE

## 2025-06-16 ENCOUNTER — APPOINTMENT (OUTPATIENT)
Dept: LAB | Facility: HOSPITAL | Age: 65
End: 2025-06-16
Payer: MEDICARE

## 2025-06-16 ENCOUNTER — HOSPITAL ENCOUNTER (OUTPATIENT)
Dept: RADIOLOGY | Facility: CLINIC | Age: 65
Discharge: HOME | End: 2025-06-16
Payer: MEDICARE

## 2025-06-16 ENCOUNTER — APPOINTMENT (OUTPATIENT)
Dept: SURGERY | Facility: CLINIC | Age: 65
End: 2025-06-16
Payer: MEDICARE

## 2025-06-16 DIAGNOSIS — C18.2 MALIGNANT NEOPLASM OF ASCENDING COLON (MULTI): Primary | ICD-10-CM

## 2025-06-16 DIAGNOSIS — C18.0 CECAL CANCER (MULTI): ICD-10-CM

## 2025-06-16 LAB
BASOPHILS # BLD AUTO: 0.04 X10*3/UL (ref 0–0.1)
BASOPHILS NFR BLD AUTO: 0.8 %
EOSINOPHIL # BLD AUTO: 0.13 X10*3/UL (ref 0–0.7)
EOSINOPHIL NFR BLD AUTO: 2.4 %
ERYTHROCYTE [DISTWIDTH] IN BLOOD BY AUTOMATED COUNT: 13.2 % (ref 11.5–14.5)
HCT VFR BLD AUTO: 38.2 % (ref 36–46)
HGB BLD-MCNC: 12.4 G/DL (ref 12–16)
IMM GRANULOCYTES # BLD AUTO: 0.01 X10*3/UL (ref 0–0.7)
IMM GRANULOCYTES NFR BLD AUTO: 0.2 % (ref 0–0.9)
LYMPHOCYTES # BLD AUTO: 1.73 X10*3/UL (ref 1.2–4.8)
LYMPHOCYTES NFR BLD AUTO: 32.5 %
MCH RBC QN AUTO: 29.5 PG (ref 26–34)
MCHC RBC AUTO-ENTMCNC: 32.5 G/DL (ref 32–36)
MCV RBC AUTO: 91 FL (ref 80–100)
MONOCYTES # BLD AUTO: 0.41 X10*3/UL (ref 0.1–1)
MONOCYTES NFR BLD AUTO: 7.7 %
NEUTROPHILS # BLD AUTO: 3 X10*3/UL (ref 1.2–7.7)
NEUTROPHILS NFR BLD AUTO: 56.4 %
NRBC BLD-RTO: 0 /100 WBCS (ref 0–0)
PLATELET # BLD AUTO: 336 X10*3/UL (ref 150–450)
RBC # BLD AUTO: 4.2 X10*6/UL (ref 4–5.2)
WBC # BLD AUTO: 5.3 X10*3/UL (ref 4.4–11.3)

## 2025-06-16 PROCEDURE — 85025 COMPLETE CBC W/AUTO DIFF WBC: CPT

## 2025-06-17 ENCOUNTER — APPOINTMENT (OUTPATIENT)
Dept: SURGERY | Facility: CLINIC | Age: 65
End: 2025-06-17
Payer: MEDICARE

## 2025-06-19 ENCOUNTER — HOSPITAL ENCOUNTER (OUTPATIENT)
Dept: RADIOLOGY | Facility: CLINIC | Age: 65
End: 2025-06-19
Payer: MEDICARE

## 2025-06-19 ENCOUNTER — APPOINTMENT (OUTPATIENT)
Dept: LAB | Facility: HOSPITAL | Age: 65
End: 2025-06-19
Payer: MEDICARE

## 2025-06-19 PROCEDURE — 80053 COMPREHEN METABOLIC PANEL: CPT

## 2025-06-19 PROCEDURE — 85025 COMPLETE CBC W/AUTO DIFF WBC: CPT

## 2025-06-19 PROCEDURE — 82378 CARCINOEMBRYONIC ANTIGEN: CPT

## 2025-06-19 PROCEDURE — 36415 COLL VENOUS BLD VENIPUNCTURE: CPT

## 2025-06-20 ENCOUNTER — LAB (OUTPATIENT)
Dept: LAB | Facility: HOSPITAL | Age: 65
End: 2025-06-20
Payer: MEDICARE

## 2025-06-20 DIAGNOSIS — C18.9 MALIGNANT NEOPLASM OF COLON, UNSPECIFIED: Primary | ICD-10-CM

## 2025-06-20 LAB
ALBUMIN SERPL BCP-MCNC: 4.4 G/DL (ref 3.4–5)
ALP SERPL-CCNC: 97 U/L (ref 33–136)
ALT SERPL W P-5'-P-CCNC: 20 U/L (ref 7–45)
ANION GAP SERPL CALC-SCNC: 9 MMOL/L (ref 10–20)
AST SERPL W P-5'-P-CCNC: 26 U/L (ref 9–39)
BASOPHILS # BLD AUTO: 0.04 X10*3/UL (ref 0–0.1)
BASOPHILS NFR BLD AUTO: 0.7 %
BILIRUB SERPL-MCNC: 0.8 MG/DL (ref 0–1.2)
BUN SERPL-MCNC: 14 MG/DL (ref 6–23)
CALCIUM SERPL-MCNC: 10.5 MG/DL (ref 8.6–10.6)
CEA SERPL-MCNC: 2.1 UG/L
CHLORIDE SERPL-SCNC: 103 MMOL/L (ref 98–107)
CO2 SERPL-SCNC: 33 MMOL/L (ref 21–32)
CREAT SERPL-MCNC: 0.68 MG/DL (ref 0.5–1.05)
EGFRCR SERPLBLD CKD-EPI 2021: >90 ML/MIN/1.73M*2
EOSINOPHIL # BLD AUTO: 0.17 X10*3/UL (ref 0–0.7)
EOSINOPHIL NFR BLD AUTO: 3 %
ERYTHROCYTE [DISTWIDTH] IN BLOOD BY AUTOMATED COUNT: 13.2 % (ref 11.5–14.5)
GLUCOSE SERPL-MCNC: 98 MG/DL (ref 74–99)
HCT VFR BLD AUTO: 40 % (ref 36–46)
HGB BLD-MCNC: 12.3 G/DL (ref 12–16)
IMM GRANULOCYTES # BLD AUTO: 0 X10*3/UL (ref 0–0.7)
IMM GRANULOCYTES NFR BLD AUTO: 0 % (ref 0–0.9)
LYMPHOCYTES # BLD AUTO: 1.76 X10*3/UL (ref 1.2–4.8)
LYMPHOCYTES NFR BLD AUTO: 30.9 %
MCH RBC QN AUTO: 29.1 PG (ref 26–34)
MCHC RBC AUTO-ENTMCNC: 30.8 G/DL (ref 32–36)
MCV RBC AUTO: 95 FL (ref 80–100)
MONOCYTES # BLD AUTO: 0.43 X10*3/UL (ref 0.1–1)
MONOCYTES NFR BLD AUTO: 7.5 %
NEUTROPHILS # BLD AUTO: 3.3 X10*3/UL (ref 1.2–7.7)
NEUTROPHILS NFR BLD AUTO: 57.9 %
NRBC BLD-RTO: 0 /100 WBCS (ref 0–0)
PLATELET # BLD AUTO: 307 X10*3/UL (ref 150–450)
POTASSIUM SERPL-SCNC: 4.4 MMOL/L (ref 3.5–5.3)
PROT SERPL-MCNC: 7 G/DL (ref 6.4–8.2)
RBC # BLD AUTO: 4.23 X10*6/UL (ref 4–5.2)
SODIUM SERPL-SCNC: 141 MMOL/L (ref 136–145)
WBC # BLD AUTO: 5.7 X10*3/UL (ref 4.4–11.3)

## 2025-06-23 ENCOUNTER — APPOINTMENT (OUTPATIENT)
Dept: HEMATOLOGY/ONCOLOGY | Facility: CLINIC | Age: 65
End: 2025-06-23
Payer: MEDICARE

## 2025-06-23 ENCOUNTER — HOSPITAL ENCOUNTER (OUTPATIENT)
Dept: RADIOLOGY | Facility: CLINIC | Age: 65
Discharge: HOME | End: 2025-06-23
Payer: MEDICARE

## 2025-06-23 PROCEDURE — 74177 CT ABD & PELVIS W/CONTRAST: CPT | Performed by: RADIOLOGY

## 2025-06-23 PROCEDURE — 2550000001 HC RX 255 CONTRASTS

## 2025-06-23 PROCEDURE — 71260 CT THORAX DX C+: CPT

## 2025-06-23 RX ADMIN — IOHEXOL 70 ML: 350 INJECTION, SOLUTION INTRAVENOUS at 15:23

## 2025-06-30 ENCOUNTER — TELEMEDICINE (OUTPATIENT)
Dept: HEMATOLOGY/ONCOLOGY | Facility: CLINIC | Age: 65
End: 2025-06-30
Payer: MEDICARE

## 2025-06-30 DIAGNOSIS — C18.9 ADENOCARCINOMA OF COLON: ICD-10-CM

## 2025-06-30 PROCEDURE — 1126F AMNT PAIN NOTED NONE PRSNT: CPT

## 2025-06-30 PROCEDURE — 1036F TOBACCO NON-USER: CPT

## 2025-06-30 PROCEDURE — 1159F MED LIST DOCD IN RCRD: CPT

## 2025-06-30 PROCEDURE — 1160F RVW MEDS BY RX/DR IN RCRD: CPT

## 2025-06-30 PROCEDURE — 99214 OFFICE O/P EST MOD 30 MIN: CPT

## 2025-06-30 ASSESSMENT — PAIN SCALES - GENERAL: PAINLEVEL_OUTOF10: 0-NO PAIN

## 2025-06-30 NOTE — PROGRESS NOTES
Virtual or Telephone Consent    While technically available, the patient was unable to connect via audio/video telehealth technology; therefore, I performed this visit using a real-time audio only connection between Martine Cornejo & DIRK Squires.  Verbal consent was requested and obtained from Martine Cornejo on this date, 06/30/25 for a telehealth visit and the patient's location was confirmed at the time of the visit.     Cancer History:  DIAGNOSIS: Cecal cancer    STAGING: pT3 pN0 Mx    CURRENT SITES OF DISEASE: None    MOLECULAR/GENOMIC:  MMR-intact    ctDNA Signatera:  12/11/23: undetected (0)   2/5/24: undetected (0)   5/13/24: undetected (0)   8/26/24: undetected (0)   3/5/25: undetected (0)  5/20/25: undetected (0)     TUMOR MARKER: CEA  11/2023 1.8  12/11/23: 1.6  5/6/24: 2.2  5/13/24: 2.0  8/26/24: 1.8  11/13/24: 2.1  6/19/25: 2.1      CURRENT TREATMENT:   Surveillance     PRIOR TREATMENT:   11/13/23: S/p R colectomy with en bloc abdominal wall resection    HISTORY:   6/2023: Presented with R groin mass. Found to have abscess in the groin. Ultrasound should complex solid & cystic mass with channel draining to the surface, recommend CT.  7/28/23: CT showed suspicious masslike thickening of the cecum and appendix for malignancy, which extends into the R inguinal canal. Nonspecific inguinal and R lower quadrant mesenteric lymphadenopathy and/or adjuacent soft tissue metastatic deposits. Nonspecific 8mm hepatic hypodensity.   9/11/23: C-scope showed ulcerated nearly complete obstructing large mass in the proximal ascending colon & cecum, mass was circumferential. 5mm polyp in sigmoid colon. Pathology of mass showed invasive moderately differentiated adenocarcinoma. Polyp was hyperplastic.  9/27/23: PET CT showed FDG avid cecal wall thickening possibly extending to the parietal peritoneum, additional nonspecific hypermetabolic focus along the cecal wall superolateral to the mass, may be physiologic with  an additional site of neoplasm not excluded.  11/13/23: Underwent R colectomy with en bloc abdominal wall resection. Intra-op cecum was densely adherent to the underside of the abdominal wall and lateral aspect of the inguinal canal, there was a palpable tumor at base of the cecum. Appendix was residing within the inguinal canal and scarred inplace due to previous perforation and abscess. Final pathology showed invasive low-grade adenocarcinoma, grade 2 moderately differentiated, no macroscopic tumor perforation, no LVI, no PNI, negative margins 0/23 LNs.     PMH: HLD    SH: , no tobacco, illicits, EtOH    FH: No FH of malignancy    Subjective:   Martine is on active surveillance monitoring. She continues to feel very well. She reports her energy level as great as she is active outside riding bikes and maintaining an active life style. Appetite is decent and weight is stable. She denies any fevers, chills, chest pain, shortness of breath, abdominal pain, nausea, vomiting, diarrhea, or rashes. Did undergo recent imaging on 6/23/25.     ROS as above. Remainder of 10-point review of systems elicited and negative.    Objective:  There were no vitals taken for this visit.    PE:----- PHONE VISIT today     ECOG Performance Status: 0    Colonoscopy 11/20/24:  Impression  The ascending colon, hepatic flexure, transverse colon, splenic flexure, descending colon, sigmoid colon, rectosigmoid, rectum, site of the anastomosis and anal region appeared normal.  Findings  The ascending colon, hepatic flexure, transverse colon, splenic flexure, descending colon, sigmoid colon, rectosigmoid, rectum, site of the anastomosis and anal region appeared normal. Prior ileocecectomy.  Recommendation  Repeat colonoscopy in 5 years, due: 11/19/2029       CT C/A/P 11/18/24:   CHEST:  1. No evidence of chest metastasis. No change from previous  examination of 05/08/2024.  2. Right thyroid nodule unchanged. Ultrasound examination has  been  performed on 06/13/2024.  3. Ectasia of the ascending aorta. Follow-up recommended.  4. Left atrial enlargement.      ABDOMEN-PELVIS:  1. Status post partial colectomy right colon. No recurrent mass or  evidence of abdominal metastasis.  2. Left ovarian dermoid unchanged.      CT CAP 6/23/25:  IMPRESSION:  CHEST FINDINGS  1. no acute pathologic findings are identified radiographically.  2. Stable nodule right lobe of the thyroid gland.  3. No evidence for metastatic disease to the chest.      ABDOMEN AND PELVIC FINDINGS  1. Suspicion of a new vague hypodensity dome of the right lobe of the  liver, posterior segment for which MRI of the liver with and without  contrast is suggested in order to exclude possible subtle metastatic  lesion.  2. Cholelithiasis.  3. Right renal cyst.  4. There is an increase in the size of the left adnexal dermoid with  a new fat fluid level present where the internal contents previously  were predominantly fat and soft tissue density. Significance is  uncertain. Perhaps this is secondary to hemorrhage into the dermoid.  Correlation with gynecologic assessment is recommended.      Signed by: Sven Sanford 6/25/2025 3:11 PM      Assessment & Plan:   Martine Cornejo is a 64 y.o. female with Stage IIA cecal cancer that presented 6/2023 after obstructing the appendix causing appendiceal abscess tracking to the R groin.      Colonoscopy 9/11/23 led to the diagnosis of cecal cancer, and she underwent surgical resection on 11/13/23 with Dr. Osuna.     Final pathology showed pT3 pN0 cecal cancer with no high risk features. Dr. Kruse previously discussed with her that while the cecal mass itself was obstructing the appendix leading to intra-abdominal abscess tracking to the groin, this is the only significant high risk feature. She also discussed the option of adjuvant chemotherapy and general risks and benefits, and patient was motivated for close surveillance over adjuvant therapy.     Plan:    Stage IIA Cecal Cancer  - Signatera, CEA, CBC, CMP q3mo for first 2 years then can space less frequently  - CT CAP 11/2024: No signs of recurrence   - C-scope done 11/2024 and due next 11/2029  - 5/2025: Signatera remains negative   - CEA remains WNL at 2.1 with most recent 6/19/25  -CT CAP 6/23: Concern for new vague hypodensity dome of the right lobe of the  Liver---- Reviewed with Dr. Sánchez. We will proceed with MRI of Liver to rule out recurrence. Plan reviewed with patient today 6/30. She is in agreement. MRI ordered   - RTC post MRI Liver completion to review plan of care moving forward.

## 2025-07-03 ENCOUNTER — OFFICE VISIT (OUTPATIENT)
Dept: SURGERY | Facility: CLINIC | Age: 65
End: 2025-07-03
Payer: MEDICARE

## 2025-07-03 VITALS
DIASTOLIC BLOOD PRESSURE: 70 MMHG | BODY MASS INDEX: 22.29 KG/M2 | TEMPERATURE: 97.5 F | SYSTOLIC BLOOD PRESSURE: 114 MMHG | HEIGHT: 67 IN | HEART RATE: 75 BPM | WEIGHT: 142 LBS

## 2025-07-03 DIAGNOSIS — C18.9 ADENOCARCINOMA OF COLON: Primary | ICD-10-CM

## 2025-07-03 PROCEDURE — 3008F BODY MASS INDEX DOCD: CPT | Performed by: NURSE PRACTITIONER

## 2025-07-03 PROCEDURE — 1036F TOBACCO NON-USER: CPT | Performed by: NURSE PRACTITIONER

## 2025-07-03 PROCEDURE — 1159F MED LIST DOCD IN RCRD: CPT | Performed by: NURSE PRACTITIONER

## 2025-07-03 PROCEDURE — 99213 OFFICE O/P EST LOW 20 MIN: CPT | Performed by: NURSE PRACTITIONER

## 2025-07-03 NOTE — PROGRESS NOTES
History Of Present Illness  Martine Cornejo is a 64 y.o. female with a hx of a cecal cancer s/p ex-lap, right colectomy, en bloc abdominal wall resection with Dr. Sven Osuna on 11/13/23. Pathology demonstrated pT3N0. MMR intact.     CEA 2.1 on 6/19/25    CT CAP 6/23/25IMPRESSION:  CHEST FINDINGS  1. no acute pathologic findings are identified radiographically.  2. Stable nodule right lobe of the thyroid gland.  3. No evidence for metastatic disease to the chest.      ABDOMEN AND PELVIC FINDINGS  1. Suspicion of a new vague hypodensity dome of the right lobe of the  liver, posterior segment for which MRI of the liver with and without  contrast is suggested in order to exclude possible subtle metastatic  lesion.  2. Cholelithiasis.  3. Right renal cyst.  4. There is an increase in the size of the left adnexal dermoid with  a new fat fluid level present where the internal contents previously  were predominantly fat and soft tissue density. Significance is  uncertain. Perhaps this is secondary to hemorrhage into the dermoid.  Correlation with gynecologic assessment is recommended    MRI liver ordered    Colonoscopy 11/2024 negative    Oncologist every 6 months    She has been doing well over the past few years.  She has no issues with her bowels.  No c/o any abdominal pain or weight loss.  No c/o any rectal bleeding.    Past Medical History  She has a past medical history of Adenocarcinoma of colon, Chicken pox, Groin mass, Hyperlipidemia, Mononucleosis, and Vision loss.    Surgical History  She has a past surgical history that includes Colonoscopy and Colon surgery (11/13/23).     Social History  She reports that she has never smoked. She has never used smokeless tobacco. She reports current alcohol use of about 2.0 standard drinks of alcohol per week. She reports that she does not use drugs.    Family History  Family History[1]     Allergies  Keflex [cephalexin] and Penicillins    Review of Systems   All systems  negative  Physical Exam   WNL  Last Recorded Vitals  /70   Pulse 75   Temp 36.4 °C (97.5 °F)   Wt 64.4 kg (142 lb)      Assessment/Plan   Martine has done well over the past 2 years since her colectomy for colon cancer.  She will have her Mri of the liver for further evaluation of the hypodensity.  She will schedule to see a HAL/GYN for further evaluation fo the left ovary.  She will call with any issues and will follow up in 6 months.       PRABHJOT Farnsworth-CNP             [1]   Family History  Problem Relation Name Age of Onset    Coronary artery disease Father

## 2025-07-25 ENCOUNTER — HOSPITAL ENCOUNTER (OUTPATIENT)
Dept: RADIOLOGY | Facility: CLINIC | Age: 65
Discharge: HOME | End: 2025-07-25
Payer: MEDICARE

## 2025-07-25 DIAGNOSIS — C18.9 ADENOCARCINOMA OF COLON: ICD-10-CM

## 2025-07-25 PROCEDURE — A9575 INJ GADOTERATE MEGLUMI 0.1ML: HCPCS

## 2025-07-25 PROCEDURE — 2550000001 HC RX 255 CONTRASTS

## 2025-07-25 PROCEDURE — 74183 MRI ABD W/O CNTR FLWD CNTR: CPT

## 2025-07-25 RX ORDER — GADOTERATE MEGLUMINE 376.9 MG/ML
0.2 INJECTION INTRAVENOUS
Status: COMPLETED | OUTPATIENT
Start: 2025-07-25 | End: 2025-07-25

## 2025-07-25 RX ADMIN — GADOTERATE MEGLUMINE 13 ML: 376.9 INJECTION INTRAVENOUS at 12:41

## 2025-08-25 ENCOUNTER — LAB (OUTPATIENT)
Dept: LAB | Facility: HOSPITAL | Age: 65
End: 2025-08-25
Payer: MEDICARE

## 2025-08-25 DIAGNOSIS — C18.9 ADENOCARCINOMA OF COLON: ICD-10-CM

## 2025-08-25 LAB
ALBUMIN SERPL BCP-MCNC: 4.4 G/DL (ref 3.4–5)
ALP SERPL-CCNC: 95 U/L (ref 33–136)
ALT SERPL W P-5'-P-CCNC: 16 U/L (ref 7–45)
ANION GAP SERPL CALC-SCNC: 12 MMOL/L (ref 10–20)
AST SERPL W P-5'-P-CCNC: 23 U/L (ref 9–39)
BASOPHILS # BLD AUTO: 0.04 X10*3/UL (ref 0–0.1)
BASOPHILS NFR BLD AUTO: 0.9 %
BILIRUB SERPL-MCNC: 0.7 MG/DL (ref 0–1.2)
BUN SERPL-MCNC: 16 MG/DL (ref 6–23)
CALCIUM SERPL-MCNC: 10.5 MG/DL (ref 8.6–10.6)
CEA SERPL-MCNC: 2.1 UG/L
CHLORIDE SERPL-SCNC: 101 MMOL/L (ref 98–107)
CO2 SERPL-SCNC: 31 MMOL/L (ref 21–32)
CREAT SERPL-MCNC: 0.82 MG/DL (ref 0.5–1.05)
EGFRCR SERPLBLD CKD-EPI 2021: 79 ML/MIN/1.73M*2
EOSINOPHIL # BLD AUTO: 0.15 X10*3/UL (ref 0–0.7)
EOSINOPHIL NFR BLD AUTO: 3.5 %
ERYTHROCYTE [DISTWIDTH] IN BLOOD BY AUTOMATED COUNT: 13.7 % (ref 11.5–14.5)
GLUCOSE SERPL-MCNC: 145 MG/DL (ref 74–99)
HCT VFR BLD AUTO: 40.5 % (ref 36–46)
HGB BLD-MCNC: 12.4 G/DL (ref 12–16)
IMM GRANULOCYTES # BLD AUTO: 0 X10*3/UL (ref 0–0.7)
IMM GRANULOCYTES NFR BLD AUTO: 0 % (ref 0–0.9)
LYMPHOCYTES # BLD AUTO: 1.31 X10*3/UL (ref 1.2–4.8)
LYMPHOCYTES NFR BLD AUTO: 30.8 %
MCH RBC QN AUTO: 28.7 PG (ref 26–34)
MCHC RBC AUTO-ENTMCNC: 30.6 G/DL (ref 32–36)
MCV RBC AUTO: 94 FL (ref 80–100)
MONOCYTES # BLD AUTO: 0.34 X10*3/UL (ref 0.1–1)
MONOCYTES NFR BLD AUTO: 8 %
NEUTROPHILS # BLD AUTO: 2.41 X10*3/UL (ref 1.2–7.7)
NEUTROPHILS NFR BLD AUTO: 56.8 %
NRBC BLD-RTO: 0 /100 WBCS (ref 0–0)
PLATELET # BLD AUTO: 312 X10*3/UL (ref 150–450)
POTASSIUM SERPL-SCNC: 5 MMOL/L (ref 3.5–5.3)
PROT SERPL-MCNC: 7.2 G/DL (ref 6.4–8.2)
RBC # BLD AUTO: 4.32 X10*6/UL (ref 4–5.2)
SODIUM SERPL-SCNC: 139 MMOL/L (ref 136–145)
WBC # BLD AUTO: 4.3 X10*3/UL (ref 4.4–11.3)

## 2025-08-25 PROCEDURE — 36415 COLL VENOUS BLD VENIPUNCTURE: CPT

## (undated) DEVICE — CUTTER, PROXIMATE LINEAR RELOAD, 75MM, BLUE

## (undated) DEVICE — MANIFOLD, 4 PORT NEPTUNE STANDARD

## (undated) DEVICE — DRESSING, ADHESIVE, ISLAND, TELFA, 4 X 10 IN

## (undated) DEVICE — STAPLER, LINEAR, RELOADABLE, 60MM 4.8, GREEN

## (undated) DEVICE — TIP, SUCTION, YANKAUER, FLEXIBLE

## (undated) DEVICE — SUTURE, CHROMIC GUT, 3-0, SH 27"

## (undated) DEVICE — SUTURE, GUT, CHROMIC, 1, 54 IN, LIGAPAK, BROWN

## (undated) DEVICE — CUTTER, PROX LINEAR, 75MM, REG TISSUE, W/ SAFETY LOCK OUT

## (undated) DEVICE — TRAY, MINOR, SINGLE BASIN, STERILE

## (undated) DEVICE — COVER, CART, 45 X 27 X 48 IN, CLEAR

## (undated) DEVICE — Device

## (undated) DEVICE — CATHETER TRAY, SURESTEP, 16FR, URINE METER W/STATLOCK

## (undated) DEVICE — GOWN, SURGICAL, SMARTGOWN, XLARGE, STERILE

## (undated) DEVICE — SUTURE, PDSII, 1, TP-1, VIL, MONO, 48LP